# Patient Record
Sex: MALE | Race: WHITE | Employment: OTHER | URBAN - METROPOLITAN AREA
[De-identification: names, ages, dates, MRNs, and addresses within clinical notes are randomized per-mention and may not be internally consistent; named-entity substitution may affect disease eponyms.]

---

## 2019-08-20 LAB
CREATININE, EXTERNAL: 1.1
LDL-C, EXTERNAL: 94

## 2020-02-24 ENCOUNTER — OFFICE VISIT (OUTPATIENT)
Dept: FAMILY MEDICINE CLINIC | Age: 80
End: 2020-02-24

## 2020-02-24 VITALS
TEMPERATURE: 98.7 F | HEIGHT: 64 IN | WEIGHT: 146 LBS | SYSTOLIC BLOOD PRESSURE: 138 MMHG | BODY MASS INDEX: 24.92 KG/M2 | HEART RATE: 69 BPM | DIASTOLIC BLOOD PRESSURE: 74 MMHG | OXYGEN SATURATION: 95 % | RESPIRATION RATE: 15 BRPM

## 2020-02-24 DIAGNOSIS — E78.00 HYPERCHOLESTEROLEMIA: ICD-10-CM

## 2020-02-24 DIAGNOSIS — I10 ESSENTIAL HYPERTENSION: ICD-10-CM

## 2020-02-24 DIAGNOSIS — R73.9 HYPERGLYCEMIA: Primary | ICD-10-CM

## 2020-02-24 PROBLEM — R20.2 PARESTHESIA OF UPPER LIMB: Status: ACTIVE | Noted: 2020-02-24

## 2020-02-24 LAB
GLUCOSE POC: 110 MG/DL
HBA1C MFR BLD HPLC: 5.6 %

## 2020-02-24 RX ORDER — OXYBUTYNIN CHLORIDE 5 MG/1
5 TABLET ORAL AS NEEDED
COMMUNITY
End: 2020-05-26 | Stop reason: ALTCHOICE

## 2020-02-24 RX ORDER — DOCUSATE SODIUM 100 MG/1
250 CAPSULE, LIQUID FILLED ORAL 2 TIMES DAILY
COMMUNITY

## 2020-02-24 RX ORDER — DOXYCYCLINE HYCLATE 20 MG
20 TABLET ORAL 2 TIMES DAILY
COMMUNITY
End: 2021-01-01 | Stop reason: SDUPTHER

## 2020-02-24 RX ORDER — DOXYCYCLINE 50 MG/1
20 CAPSULE ORAL 2 TIMES DAILY
COMMUNITY
End: 2020-05-26 | Stop reason: SDUPTHER

## 2020-02-24 NOTE — PROGRESS NOTES
Chief Complaint   Patient presents with   [address]     Pt is a retired nephrologist. Pt sees Dr. Hortencia Taylor for neurology. Pt reports that he has noted memory changes, primarily short term changes. Pt reports that he will forget a book he is reading, why is went in a room. Pt reports some depth perception changes, has to look when putting a top on a bottle. Patient provided extensive medical history today, please see scanned documents. Patient is on chronic doxycycline to help with dental issues. No refills needed at this time. Subjective: (As above and below)     Chief Complaint   Patient presents with   [address]     he is a 67y.o. year old male who presents for evaluation. Reviewed PmHx, RxHx, FmHx, SocHx, AllgHx and updated in chart. Review of Systems - negative except as listed above    Objective:     Vitals:    02/24/20 0749   BP: 138/74   Pulse: 69   Resp: 15   Temp: 98.7 °F (37.1 °C)   TempSrc: Oral   SpO2: 95%   Weight: 146 lb (66.2 kg)   Height: 5' 4\" (1.626 m)     Physical Examination: General appearance - alert, well appearing, and in no distress  Mental status - normal mood, behavior, speech, dress, motor activity, and thought processes  Mouth - mucous membranes moist, pharynx normal without lesions  Chest - clear to auscultation, no wheezes, rales or rhonchi, symmetric air entry  Heart - normal rate, regular rhythm, normal S1, S2, no murmurs, rubs, clicks or gallops  Musculoskeletal - no joint tenderness, deformity or swelling  Extremities - peripheral pulses normal, no pedal edema, no clubbing or cyanosis    Assessment/ Plan:   1. Hyperglycemia  -Labs stable, continue to work on dietary changes  - AMB POC HEMOGLOBIN A1C  - AMB POC GLUCOSE BLOOD, BY GLUCOSE MONITORING DEVICE    2. Hypercholesterolemia  -Continue on current medications    3.  Essential hypertension  Well-controlled today    Follow-up in 3 months or as needed       I have discussed the diagnosis with the patient and the intended plan as seen in the above orders. The patient has received an after-visit summary and questions were answered concerning future plans.      Medication Side Effects and Warnings were discussed with patient: yes  Patient Labs were reviewed: yes  Patient Past Records were reviewed:  yes    Vickie Beavers M.D.

## 2020-02-24 NOTE — PROGRESS NOTES
Chief Complaint   Patient presents with   [facility] Establish Care       1. Have you been to the ER, urgent care clinic since your last visit? Hospitalized since your last visit? No    2. Have you seen or consulted any other health care providers outside of the [address] [place] [address] since your last visit? Include any pap smears or colon screening.  No    Health Maintenance Due   Topic Date Due    Lipid Screen  06/03/1950    DTaP/Tdap/Td series (1 - Tdap) 06/03/1952    Shingrix Vaccine Age 50> (1 of 2) 06/03/1991    GLAUCOMA SCREENING Q2Y  06/03/2006    Pneumococcal 65+ years (1 of 1 - PPSV23) 06/03/2006    Medicare Yearly Exam  02/24/2020

## 2020-05-26 ENCOUNTER — VIRTUAL VISIT (OUTPATIENT)
Dept: FAMILY MEDICINE CLINIC | Age: 80
End: 2020-05-26

## 2020-05-26 VITALS
BODY MASS INDEX: 25.27 KG/M2 | SYSTOLIC BLOOD PRESSURE: 132 MMHG | DIASTOLIC BLOOD PRESSURE: 74 MMHG | HEIGHT: 64 IN | HEART RATE: 68 BPM | WEIGHT: 148 LBS

## 2020-05-26 DIAGNOSIS — I10 ESSENTIAL HYPERTENSION: Primary | ICD-10-CM

## 2020-05-26 DIAGNOSIS — E78.00 HYPERCHOLESTEROLEMIA: ICD-10-CM

## 2020-05-26 DIAGNOSIS — K00.9 DENTAL ANOMALY: ICD-10-CM

## 2020-05-26 RX ORDER — ATENOLOL 50 MG/1
50 TABLET ORAL DAILY
Qty: 90 TAB | Refills: 3 | Status: SHIPPED | OUTPATIENT
Start: 2020-05-26 | End: 2021-01-01 | Stop reason: SDUPTHER

## 2020-05-26 RX ORDER — MELOXICAM 7.5 MG/1
7.5 TABLET ORAL DAILY
Qty: 90 TAB | Refills: 3 | Status: SHIPPED | OUTPATIENT
Start: 2020-05-26 | End: 2021-05-17 | Stop reason: SDUPTHER

## 2020-05-26 RX ORDER — TAMSULOSIN HYDROCHLORIDE 0.4 MG/1
0.4 CAPSULE ORAL
Qty: 90 CAP | Refills: 3 | Status: SHIPPED | OUTPATIENT
Start: 2020-05-26 | End: 2021-01-01 | Stop reason: SDUPTHER

## 2020-05-26 RX ORDER — ATORVASTATIN CALCIUM 80 MG/1
80 TABLET, FILM COATED ORAL
Qty: 90 TAB | Refills: 3 | Status: SHIPPED | OUTPATIENT
Start: 2020-05-26 | End: 2021-01-01 | Stop reason: SDUPTHER

## 2020-05-26 RX ORDER — DOXYCYCLINE 100 MG/1
100 CAPSULE ORAL 2 TIMES DAILY
Qty: 60 CAP | Refills: 3 | Status: SHIPPED | OUTPATIENT
Start: 2020-05-26 | End: 2022-01-01 | Stop reason: ALTCHOICE

## 2020-05-26 RX ORDER — LISINOPRIL 10 MG/1
10 TABLET ORAL 2 TIMES DAILY
Qty: 180 TAB | Refills: 3 | Status: SHIPPED | OUTPATIENT
Start: 2020-05-26 | End: 2021-01-01

## 2020-05-26 RX ORDER — DOXYCYCLINE 100 MG/1
100 CAPSULE ORAL 2 TIMES DAILY
COMMUNITY
End: 2020-05-26 | Stop reason: ALTCHOICE

## 2020-05-26 RX ORDER — CLINDAMYCIN HYDROCHLORIDE 150 MG/1
300 CAPSULE ORAL AS NEEDED
Qty: 30 CAP | Refills: 3 | Status: SHIPPED | OUTPATIENT
Start: 2020-05-26 | End: 2021-01-01

## 2020-05-26 NOTE — PROGRESS NOTES
Chief Complaint   Patient presents with    Medication Refill       1. Have you been to the ER, urgent care clinic since your last visit? Hospitalized since your last visit? No    2. Have you seen or consulted any other health care providers outside of the [address] [place] [address] since your last visit? Include any pap smears or colon screening.  No    Health Maintenance Due   Topic Date Due    DTaP/Tdap/Td series (1 - Tdap) 06/03/1962    Shingrix Vaccine Age 50> (1 of 2) 06/03/1991

## 2020-05-26 NOTE — PROGRESS NOTES
Chief Complaint   Patient presents with    Medication Refill     Pt was evaluated via phone only. Pt reports that he has been able to stay at home completely during the quarantine. Pt has been doing a lot of work outside, staying active. Pt needs refills on all medications with [place]. Pt reports that he has noticed a change in memory, somewhat worsened, but well articulated by patient. Fabiana Zuniga is a 67 y.o. male evaluated via audio only technology on 5/26/2020. Consent: He and/or his health care decision maker is aware that he may receive a bill for this audio only encounter, depending on his insurance coverage, and has provided verbal consent to proceed: Yes    I communicated with the patient and/or health care decision maker about the nature and details of the following:  Assessment & Plan:   1. Essential hypertension  -well controlled per home readings    2. Hypercholesterolemia  -done in February    3. Dental anomaly  -medications refilled for preventative treatment    Reviewed medications at length. 12  Subjective:   Fabiana Zuniga is a 67 y.o. male who was seen for Medication Refill      Prior to Admission medications    Medication Sig Start Date End Date Taking? Authorizing Provider   docusate sodium (COLACE) 100 mg capsule Take 100 mg by mouth two (2) times a day. Yes Provider, Historical   doxycycline (PERIOSTAT) 20 mg tablet Take 20 mg by mouth two (2) times a day. Yes Provider, Historical   meloxicam (MOBIC) 7.5 mg tablet Take 7.5 mg by mouth daily. Yes Provider, Historical   clindamycin (CLEOCIN) 150 mg capsule Take 300 mg by mouth as needed. Pre procedure and dental procedure    Yes Provider, Historical   lisinopril (PRINIVIL, ZESTRIL) 10 mg tablet Take 10 mg by mouth daily. Yes Provider, Historical   atenolol (TENORMIN) 50 mg tablet Take 50 mg by mouth daily. Yes Provider, Historical   atorvastatin (LIPITOR) 40 mg tablet Take 80 mg by mouth nightly.    Yes Provider, Historical   tamsulosin (FLOMAX) 0.4 mg capsule Take 0.4 mg by mouth nightly. Yes Provider, Historical   omeprazole (PRILOSEC) 20 mg capsule Take 20 mg by mouth daily. Yes Provider, Historical   doxycycline (MONODOX) 100 mg capsule Take 100 mg by mouth two (2) times a day. 5/26/20  Provider, Historical   doxycycline (MONODOX) 50 mg capsule Take 20 mg by mouth two (2) times a day. 5/26/20  Provider, Historical   oxybutynin (DITROPAN) 5 mg tablet Take 5 mg by mouth as needed. 5/26/20  Provider, Historical   ibuprofen (MOTRIN) 400 mg tablet Take 400 mg by mouth every six (6) hours as needed for Pain. 5/26/20  Provider, Historical   sildenafil citrate (VIAGRA) 50 mg tablet Take 50 mg by mouth as needed. 5/26/20  Provider, Historical   clopidogrel (PLAVIX) 75 mg tablet Take 1 tablet by mouth daily. 1/22/15 5/26/20  Domi Young MD   aspirin 81 mg tablet Take 81 mg by mouth daily. 5/26/20  Provider, Historical     Allergies   Allergen Reactions    Cephalexin Rash    Cephalosporins Rash    Contrast Agent [Iodine] Other (comments)     IV radio contrast caused urticaria years ago. Has had a CT scan since without problems.  Penicillins Rash       Patient Active Problem List   Diagnosis Code    Essential hypertension I10    Hypercholesterolemia E78.00    Hyperglycemia R73.9    Paresthesia of upper limb R20.2       ROS    I affirm this is a Patient-Initiated Episode with a Patient who has not had a related appointment within my department in the past 7 days or scheduled within the next 24 hours.     Total Time: minutes: 21-30 minutes    Note: not billable if this call serves to triage the patient into an appointment for the relevant concern      Gelacio Caba MD

## 2020-08-24 ENCOUNTER — HOSPITAL ENCOUNTER (OUTPATIENT)
Dept: LAB | Age: 80
Discharge: HOME | End: 2020-08-24
Payer: MEDICARE

## 2020-08-24 ENCOUNTER — OFFICE VISIT (OUTPATIENT)
Dept: FAMILY MEDICINE CLINIC | Age: 80
End: 2020-08-24
Payer: MEDICARE

## 2020-08-24 VITALS
DIASTOLIC BLOOD PRESSURE: 71 MMHG | RESPIRATION RATE: 16 BRPM | TEMPERATURE: 98.1 F | OXYGEN SATURATION: 98 % | HEART RATE: 66 BPM | BODY MASS INDEX: 24.55 KG/M2 | SYSTOLIC BLOOD PRESSURE: 160 MMHG | HEIGHT: 64 IN | WEIGHT: 143.8 LBS

## 2020-08-24 DIAGNOSIS — Z00.00 MEDICARE ANNUAL WELLNESS VISIT, SUBSEQUENT: ICD-10-CM

## 2020-08-24 DIAGNOSIS — R73.9 HYPERGLYCEMIA: ICD-10-CM

## 2020-08-24 DIAGNOSIS — E78.00 HYPERCHOLESTEROLEMIA: Primary | ICD-10-CM

## 2020-08-24 DIAGNOSIS — I10 ESSENTIAL HYPERTENSION: ICD-10-CM

## 2020-08-24 PROCEDURE — G8510 SCR DEP NEG, NO PLAN REQD: HCPCS | Performed by: FAMILY MEDICINE

## 2020-08-24 PROCEDURE — G8536 NO DOC ELDER MAL SCRN: HCPCS | Performed by: FAMILY MEDICINE

## 2020-08-24 PROCEDURE — 99213 OFFICE O/P EST LOW 20 MIN: CPT | Performed by: FAMILY MEDICINE

## 2020-08-24 PROCEDURE — 83036 HEMOGLOBIN GLYCOSYLATED A1C: CPT

## 2020-08-24 PROCEDURE — 1100F PTFALLS ASSESS-DOCD GE2>/YR: CPT | Performed by: FAMILY MEDICINE

## 2020-08-24 PROCEDURE — G8753 SYS BP > OR = 140: HCPCS | Performed by: FAMILY MEDICINE

## 2020-08-24 PROCEDURE — 99000 SPECIMEN HANDLING OFFICE-LAB: CPT | Performed by: FAMILY MEDICINE

## 2020-08-24 PROCEDURE — 3288F FALL RISK ASSESSMENT DOCD: CPT | Performed by: FAMILY MEDICINE

## 2020-08-24 PROCEDURE — G8420 CALC BMI NORM PARAMETERS: HCPCS | Performed by: FAMILY MEDICINE

## 2020-08-24 PROCEDURE — 85025 COMPLETE CBC W/AUTO DIFF WBC: CPT

## 2020-08-24 PROCEDURE — G8754 DIAS BP LESS 90: HCPCS | Performed by: FAMILY MEDICINE

## 2020-08-24 PROCEDURE — 84443 ASSAY THYROID STIM HORMONE: CPT

## 2020-08-24 PROCEDURE — G8427 DOCREV CUR MEDS BY ELIG CLIN: HCPCS | Performed by: FAMILY MEDICINE

## 2020-08-24 PROCEDURE — 80061 LIPID PANEL: CPT

## 2020-08-24 PROCEDURE — 80053 COMPREHEN METABOLIC PANEL: CPT

## 2020-08-24 PROCEDURE — 36415 COLL VENOUS BLD VENIPUNCTURE: CPT | Performed by: FAMILY MEDICINE

## 2020-08-24 PROCEDURE — G0463 HOSPITAL OUTPT CLINIC VISIT: HCPCS | Performed by: FAMILY MEDICINE

## 2020-08-24 PROCEDURE — G0439 PPPS, SUBSEQ VISIT: HCPCS | Performed by: FAMILY MEDICINE

## 2020-08-24 NOTE — PROGRESS NOTES
Chief Complaint   Patient presents with    Hypertension     follow up    Skin Problem     bottom       1. Have you been to the ER, urgent care clinic since your last visit? Hospitalized since your last visit? No    2. Have you seen or consulted any other health care providers outside of the [address] [place] [address] since your last visit? Include any pap smears or colon screening.  No    Health Maintenance Due   Topic Date Due    DTaP/Tdap/Td series (1 - Tdap) 06/03/1962    Shingrix Vaccine Age 50> (1 of 2) 06/03/1991    Medicare Yearly Exam  08/20/2020    Lipid Screen  08/21/2020

## 2020-08-24 NOTE — PROGRESS NOTES
Chief Complaint   Patient presents with    Hypertension     follow up    Skin Problem     bottom     Pt reports that he has been struggling with labile blood pressure, high and low. Pt has changed around the timing of his medications. Pt reports that he gets symptomatic when his blood pressure gets low. Pt has noticed that when he gets dehydrated his blood pressures are more up and down. Subjective: (As above and below)     Chief Complaint   Patient presents with    Hypertension     follow up    Skin Problem     bottom     he is a 79y.o. year old male who presents for evaluation. Reviewed PmHx, RxHx, FmHx, SocHx, AllgHx and updated in chart. Review of Systems - negative except as listed above    Objective:     Vitals:    08/24/20 0734   BP: 160/71   Pulse: 66   Resp: 16   Temp: 98.1 °F (36.7 °C)   TempSrc: Temporal   SpO2: 98%   Weight: 143 lb 12.8 oz (65.2 kg)   Height: 5' 4\" (1.626 m)     Physical Examination: General appearance - alert, well appearing, and in no distress  Mental status - normal mood, behavior, speech, dress, motor activity, and thought processes  Eyes - pupils equal and reactive, extraocular eye movements intact  Chest - clear to auscultation, no wheezes, rales or rhonchi, symmetric air entry  Heart - normal rate, regular rhythm, normal S1, S2, no murmurs, rubs, clicks or gallops  Musculoskeletal - no joint tenderness, deformity or swelling  Extremities - peripheral pulses normal, no pedal edema, no clubbing or cyanosis  Skin - left inner gluteal crease, flesh colored nevus with superficial trauma, slight know under the skin, no erythema    Assessment/ Plan:   1. Hypercholesterolemia  -check fasting labs  - METABOLIC PANEL, COMPREHENSIVE  - LIPID PANEL    2. Essential hypertension  -stable with current pt management  - CBC WITH AUTOMATED DIFF  - TSH 3RD GENERATION  - COLLECTION VENOUS BLOOD,VENIPUNCTURE  - [place] HANDLG&/OR CONVEY OF SPEC FOR TR OFFICE TO LAB    3. Hyperglycemia  - HEMOGLOBIN A1C WITH EAG    4. Medicare annual wellness visit, subsequent  -see below       I have discussed the diagnosis with the patient and the intended plan as seen in the above orders. The patient has received an after-visit summary and questions were answered concerning future plans. Medication Side Effects and Warnings were discussed with patient: yes  Patient Labs were reviewed: yes  Patient Past Records were reviewed:  yes    jus Womack M.D. This is the Subsequent Medicare Annual Wellness Exam, performed 12 months or more after the Initial AWV or the last Subsequent AWV    I have reviewed the patient's medical history in detail and updated the computerized patient record.      History     Patient Active Problem List   Diagnosis Code    Essential hypertension I10    Hypercholesterolemia E78.00    Hyperglycemia R73.9    Paresthesia of upper limb R20.2     Past Medical History:   Diagnosis Date    Anxiety     years ago    Arrhythmia     SVT    Arthritis     DJD knees    Chronic bronchitis (HCC)     Elevated PSA     Enlarged prostate     GERD (gastroesophageal reflux disease)     Gingivitis     Hepatitis A antibody positive     Hepatitis B antibody positive     Hypertension     Liver disease     Mitral chordae rupture (HCC)     Other ill-defined conditions(799.89)     increased cholesterol    Other ill-defined conditions(799.89)     ruptured chordea tinea right mitral valve    Other ill-defined conditions(799.89)     left ventricular hypertrophy    Other ill-defined conditions(799.89)     hepatitis A and B exposure    Other ill-defined [id number](753.47)     skin lichen planus    Tinnitus       Past Surgical History:   Procedure Laterality Date    HX HEENT      T & A    HX KNEE ARTHROSCOPY  2002    bilateral    HX OTHER SURGICAL      right inguinal hernia repair    HX OTHER SURGICAL      colonoscopy - diverticulosis/no polyps    HX OTHER SURGICAL  1969 right forearm fragment wound     Current Outpatient Medications   Medication Sig Dispense Refill    tamsulosin (Flomax) 0.4 mg capsule Take 1 Cap by mouth nightly. 90 Cap 3    atorvastatin (LIPITOR) 80 mg tablet Take 1 Tab by mouth nightly. 90 Tab 3    atenoloL (TENORMIN) 50 mg tablet Take 1 Tab by mouth daily. 90 Tab 3    meloxicam (MOBIC) 7.5 mg tablet Take 1 Tab by mouth daily. 90 Tab 3    doxycycline (MONODOX) 100 mg capsule Take 1 Cap by mouth two (2) times a day. 60 Cap 3    clindamycin (CLEOCIN) 150 mg capsule Take 2 Caps by mouth as needed (pre procedure). Pre procedure and dental procedure 30 Cap 3    docusate sodium (COLACE) 100 mg capsule Take 100 mg by mouth two (2) times a day.  doxycycline (PERIOSTAT) 20 mg tablet Take 20 mg by mouth two (2) times a day.  omeprazole (PRILOSEC) 20 mg capsule Take 20 mg by mouth daily.  lisinopriL (PRINIVIL, ZESTRIL) 10 mg tablet Take 1 Tab by mouth two (2) times a day. 180 Tab 3     Allergies   Allergen Reactions    Cephalexin Rash    Cephalosporins Rash    Contrast Agent [Iodine] Other (comments)     IV radio contrast caused urticaria years ago. Has had a CT scan since without problems.  Penicillins Rash       Family History   Problem Relation Age of Onset    Lung Disease Mother         pneumococcal sepsis/pneumonia    Diabetes Mother     Kidney Disease Father     Heart Disease Father         aortic valve disease    Anesth Problems Neg Hx      Social History     Tobacco Use    Smoking status: Current Every Day Smoker     Packs/day: 1.00     Years: 54.00     Pack years: 54.00    Smokeless tobacco: Never Used    Tobacco comment: cigarettes   Substance Use Topics    Alcohol use:  Yes     Alcohol/week: 0.8 standard drinks     Types: 1 Glasses of wine per week       Depression Risk Factor Screening:     3 most recent PHQ Screens 8/24/2020   Little interest or pleasure in doing things Not at all   Feeling down, depressed, irritable, or hopeless Not at all   Total Score PHQ 2 0       Alcohol Risk Factor Screening (MALE > 65): Do you average more 1 drink per night or more than 7 drinks a week: No    In the past three months have you have had more than 4 drinks containing alcohol on one occasion: No      Functional Ability and Level of Safety:   Hearing: The patient needs further evaluation. Pt has noted high pitched hearing loss. Activities of Daily Living: The home contains: no safety equipment. Patient does total self care     Ambulation: with no difficulty     Fall Risk:  Fall Risk Assessment, last 12 mths 8/24/2020   Able to walk? Yes   Fall in past 12 months? Yes   Fall with injury? No   Number of falls in past 12 months 8 or more   Fall Risk Score 8     Abuse Screen:  Patient is not abused       Cognitive Screening   Has your family/caregiver stated any concerns about your memory: yes - has noted gradual worsening     Patient Care Team   Patient Care Team:  Felicity Mccormack MD as PCP - General (Family Medicine)  [facility] - [facility] Empaneled Provider    Assessment/Plan   Education and counseling provided:  Are appropriate based on today's review and evaluation    Diagnoses and all orders for this visit:    1. Hypercholesterolemia  -     METABOLIC PANEL, COMPREHENSIVE  -     LIPID PANEL    2. Essential hypertension  -     CBC WITH AUTOMATED DIFF  -     TSH 3RD GENERATION  -     COLLECTION VENOUS BLOOD,VENIPUNCTURE  -     [place] HANDLG&/OR CONVEY OF SPEC FOR TR OFFICE TO LAB    3. Hyperglycemia  -     HEMOGLOBIN A1C WITH EAG    4.  Medicare annual wellness visit, subsequent        Health Maintenance Due   Topic Date Due    DTaP/Tdap/Td series (1 - Tdap) 06/03/1962    Shingrix Vaccine Age 50> (1 of 2) 06/03/1991    Lipid Screen  08/21/2020

## 2020-08-24 NOTE — PATIENT INSTRUCTIONS
Medicare Wellness Visit, Male The best way to live healthy is to have a lifestyle where you eat a well-balanced diet, exercise regularly, limit alcohol use, and quit all forms of tobacco/nicotine, if applicable. Regular preventive services are another way to keep healthy. Preventive services (vaccines, screening tests, monitoring & exams) can help personalize your care plan, which helps you manage your own care. Screening tests can find health problems at the earliest stages, when they are easiest to treat. [facility] follows the current, evidence-based guidelines published by the [facility] ([facility]) when recommending preventive services for our patients. Because we follow these guidelines, sometimes recommendations change over time as research supports it. (For example, a prostate screening blood test is no longer routinely recommended for men with no symptoms). Of course, you and your doctor may decide to screen more often for some diseases, based on your risk and co-morbidities (chronic disease you are already diagnosed with). Preventive services for you include: - Medicare offers their members a free annual wellness visit, which is time for you and your primary care provider to discuss and plan for your preventive service needs. Take advantage of this benefit every year! 
-All adults over age 72 should receive the recommended pneumonia vaccines. Current USPSTF guidelines recommend a series of two vaccines for the best pneumonia protection.  
-All adults should have a flu vaccine yearly and tetanus vaccine every 10 years. 
-All adults age 48 and older should receive the shingles vaccines (series of two vaccines).       
-All adults age 38-68 who are overweight should have a diabetes screening test once every three years.  
-Other screening tests & preventive services for persons with diabetes include: an eye exam to screen for diabetic retinopathy, a kidney function test, a foot exam, and stricter control over your cholesterol.  
-Cardiovascular screening for adults with routine risk involves an electrocardiogram (ECG) at intervals determined by the provider.  
-Colorectal cancer screening should be done for adults age 54-65 with no increased risk factors for colorectal cancer. There are a number of acceptable methods of screening for this type of cancer. Each test has its own benefits and drawbacks. Discuss with your provider what is most appropriate for you during your annual wellness visit. The different tests include: colonoscopy (considered the best screening method), a fecal occult blood test, a fecal DNA test, and sigmoidoscopy. 
-All adults born between [facility] should be screened once for Hepatitis C. 
-An Abdominal Aortic Aneurysm (AAA) Screening is recommended for men age 73-68 who has ever smoked in their lifetime. Here is a list of your current Health Maintenance items (your personalized list of preventive services) with a due date: 
Health Maintenance Due Topic Date Due  
 DTaP/Tdap/Td  (1 - Tdap) 06/03/1962  Shingles Vaccine (1 of 2) 06/03/1991  Cholesterol Test   08/21/2020

## 2020-08-25 LAB
ALBUMIN SERPL-MCNC: 4 G/DL (ref 3.7–4.7)
ALBUMIN/GLOB SERPL: 1.7 {RATIO} (ref 1.2–2.2)
ALP SERPL-CCNC: 109 IU/L (ref 39–117)
ALT SERPL-CCNC: 14 IU/L (ref 0–44)
AST SERPL-CCNC: 19 IU/L (ref 0–40)
BASOPHILS # BLD AUTO: 0.1 X10E3/UL (ref 0–0.2)
BASOPHILS NFR BLD AUTO: 1 %
BILIRUB SERPL-MCNC: 0.4 MG/DL (ref 0–1.2)
BUN SERPL-MCNC: 24 MG/DL (ref 8–27)
BUN/CREAT SERPL: 19 (ref 10–24)
CALCIUM SERPL-MCNC: 9.6 MG/DL (ref 8.6–10.2)
CHLORIDE SERPL-SCNC: 101 MMOL/L (ref 96–106)
CHOLEST SERPL-MCNC: 205 MG/DL (ref 100–199)
CO2 SERPL-SCNC: 23 MMOL/L (ref 20–29)
CREAT SERPL-MCNC: 1.27 MG/DL (ref 0.76–1.27)
EOSINOPHIL # BLD AUTO: 0.3 X10E3/UL (ref 0–0.4)
EOSINOPHIL NFR BLD AUTO: 3 %
ERYTHROCYTE [DISTWIDTH] IN BLOOD BY AUTOMATED COUNT: 13.5 % (ref 11.6–15.4)
EST. AVERAGE GLUCOSE BLD GHB EST-MCNC: 120 MG/DL
GLOBULIN SER CALC-MCNC: 2.4 G/DL (ref 1.5–4.5)
GLUCOSE SERPL-MCNC: 94 MG/DL (ref 65–99)
HBA1C MFR BLD: 5.8 % (ref 4.8–5.6)
HCT VFR BLD AUTO: 41.8 % (ref 37.5–51)
HDLC SERPL-MCNC: 66 MG/DL
HGB BLD-MCNC: 14 G/DL (ref 13–17.7)
IMM GRANULOCYTES # BLD AUTO: 0 X10E3/UL (ref 0–0.1)
IMM GRANULOCYTES NFR BLD AUTO: 0 %
INTERPRETATION, 910389: NORMAL
INTERPRETATION: NORMAL
LDLC SERPL CALC-MCNC: 128 MG/DL (ref 0–99)
LYMPHOCYTES # BLD AUTO: 1.4 X10E3/UL (ref 0.7–3.1)
LYMPHOCYTES NFR BLD AUTO: 14 %
MCH RBC QN AUTO: 29.1 PG (ref 26.6–33)
MCHC RBC AUTO-ENTMCNC: 33.5 G/DL (ref 31.5–35.7)
MCV RBC AUTO: 87 FL (ref 79–97)
MONOCYTES # BLD AUTO: 0.9 X10E3/UL (ref 0.1–0.9)
MONOCYTES NFR BLD AUTO: 9 %
NEUTROPHILS # BLD AUTO: 7.4 X10E3/UL (ref 1.4–7)
NEUTROPHILS NFR BLD AUTO: 73 %
PDF IMAGE, 910387: NORMAL
PLATELET # BLD AUTO: 294 X10E3/UL (ref 150–450)
POTASSIUM SERPL-SCNC: 4.9 MMOL/L (ref 3.5–5.2)
PROT SERPL-MCNC: 6.4 G/DL (ref 6–8.5)
RBC # BLD AUTO: 4.81 X10E6/UL (ref 4.14–5.8)
SODIUM SERPL-SCNC: 138 MMOL/L (ref 134–144)
TRIGL SERPL-MCNC: 54 MG/DL (ref 0–149)
TSH SERPL DL<=0.005 MIU/L-ACNC: 0.92 UIU/ML (ref 0.45–4.5)
VLDLC SERPL CALC-MCNC: 11 MG/DL (ref 5–40)
WBC # BLD AUTO: 10.1 X10E3/UL (ref 3.4–10.8)

## 2020-08-27 NOTE — PROGRESS NOTES
Increase in risk for diabetes, please closely monitor diet and increase exercise   Cholesterol is elevated, please closely monitor diet and increase exercise, recheck in 6 months. All other labs are within normal limits. A message has been sent in [employer] and the lab work released to the patient.

## 2021-01-01 ENCOUNTER — TELEPHONE (OUTPATIENT)
Dept: SURGERY | Age: 81
End: 2021-01-01

## 2021-01-01 ENCOUNTER — OFFICE VISIT (OUTPATIENT)
Dept: SURGERY | Age: 81
End: 2021-01-01
Payer: MEDICARE

## 2021-01-01 ENCOUNTER — OFFICE VISIT (OUTPATIENT)
Dept: ONCOLOGY | Age: 81
End: 2021-01-01
Payer: MEDICARE

## 2021-01-01 ENCOUNTER — TELEPHONE (OUTPATIENT)
Dept: ONCOLOGY | Age: 81
End: 2021-01-01

## 2021-01-01 ENCOUNTER — DOCUMENTATION ONLY (OUTPATIENT)
Dept: ONCOLOGY | Age: 81
End: 2021-01-01

## 2021-01-01 ENCOUNTER — TELEPHONE (OUTPATIENT)
Dept: FAMILY MEDICINE CLINIC | Age: 81
End: 2021-01-01

## 2021-01-01 ENCOUNTER — HOSPITAL ENCOUNTER (OUTPATIENT)
Dept: CT IMAGING | Age: 81
Discharge: HOME | End: 2021-08-25
Attending: SURGERY
Payer: MEDICARE

## 2021-01-01 ENCOUNTER — HOSPITAL ENCOUNTER (OUTPATIENT)
Dept: LAB | Age: 81
Discharge: HOME | End: 2021-08-31

## 2021-01-01 ENCOUNTER — OFFICE VISIT (OUTPATIENT)
Dept: FAMILY MEDICINE CLINIC | Age: 81
End: 2021-01-01
Payer: MEDICARE

## 2021-01-01 VITALS
DIASTOLIC BLOOD PRESSURE: 63 MMHG | TEMPERATURE: 98.1 F | SYSTOLIC BLOOD PRESSURE: 143 MMHG | BODY MASS INDEX: 25.1 KG/M2 | HEART RATE: 70 BPM | HEIGHT: 64 IN | OXYGEN SATURATION: 97 % | WEIGHT: 147 LBS

## 2021-01-01 VITALS
RESPIRATION RATE: 16 BRPM | SYSTOLIC BLOOD PRESSURE: 136 MMHG | HEIGHT: 64 IN | BODY MASS INDEX: 24.96 KG/M2 | OXYGEN SATURATION: 97 % | DIASTOLIC BLOOD PRESSURE: 69 MMHG | WEIGHT: 146.2 LBS | TEMPERATURE: 98.2 F | HEART RATE: 58 BPM

## 2021-01-01 VITALS
TEMPERATURE: 97.5 F | DIASTOLIC BLOOD PRESSURE: 57 MMHG | SYSTOLIC BLOOD PRESSURE: 137 MMHG | HEART RATE: 68 BPM | RESPIRATION RATE: 18 BRPM | HEIGHT: 64 IN | BODY MASS INDEX: 24.24 KG/M2 | WEIGHT: 142 LBS | OXYGEN SATURATION: 97 %

## 2021-01-01 VITALS
TEMPERATURE: 97.3 F | SYSTOLIC BLOOD PRESSURE: 162 MMHG | HEART RATE: 78 BPM | HEIGHT: 64 IN | WEIGHT: 142 LBS | BODY MASS INDEX: 24.24 KG/M2 | OXYGEN SATURATION: 98 % | DIASTOLIC BLOOD PRESSURE: 78 MMHG | RESPIRATION RATE: 16 BRPM

## 2021-01-01 VITALS
OXYGEN SATURATION: 99 % | RESPIRATION RATE: 18 BRPM | SYSTOLIC BLOOD PRESSURE: 167 MMHG | WEIGHT: 145.9 LBS | BODY MASS INDEX: 24.91 KG/M2 | DIASTOLIC BLOOD PRESSURE: 70 MMHG | TEMPERATURE: 98 F | HEART RATE: 64 BPM | HEIGHT: 64 IN

## 2021-01-01 VITALS
DIASTOLIC BLOOD PRESSURE: 60 MMHG | HEIGHT: 64 IN | RESPIRATION RATE: 18 BRPM | HEART RATE: 65 BPM | OXYGEN SATURATION: 100 % | WEIGHT: 146 LBS | TEMPERATURE: 98.4 F | BODY MASS INDEX: 24.92 KG/M2 | SYSTOLIC BLOOD PRESSURE: 150 MMHG

## 2021-01-01 DIAGNOSIS — Z00.00 MEDICARE ANNUAL WELLNESS VISIT, SUBSEQUENT: ICD-10-CM

## 2021-01-01 DIAGNOSIS — I10 ESSENTIAL HYPERTENSION: ICD-10-CM

## 2021-01-01 DIAGNOSIS — R22.2 MASS ON BACK: ICD-10-CM

## 2021-01-01 DIAGNOSIS — Z79.899 ENCOUNTER FOR MONITORING CARDIOTOXIC DRUG THERAPY: ICD-10-CM

## 2021-01-01 DIAGNOSIS — R22.2 MASS ON BACK: Primary | ICD-10-CM

## 2021-01-01 DIAGNOSIS — R73.9 HYPERGLYCEMIA: ICD-10-CM

## 2021-01-01 DIAGNOSIS — Z23 NEEDS FLU SHOT: Primary | ICD-10-CM

## 2021-01-01 DIAGNOSIS — K00.9 DENTAL ANOMALY: ICD-10-CM

## 2021-01-01 DIAGNOSIS — C49.9 LEIOMYOSARCOMA (HCC): Primary | ICD-10-CM

## 2021-01-01 DIAGNOSIS — E78.00 HYPERCHOLESTEROLEMIA: ICD-10-CM

## 2021-01-01 DIAGNOSIS — M25.50 ARTHRALGIA, UNSPECIFIED JOINT: ICD-10-CM

## 2021-01-01 DIAGNOSIS — R22.2 MASS OF SUBCUTANEOUS TISSUE OF BACK: Primary | ICD-10-CM

## 2021-01-01 DIAGNOSIS — Z51.81 ENCOUNTER FOR MONITORING CARDIOTOXIC DRUG THERAPY: ICD-10-CM

## 2021-01-01 LAB
ALBUMIN SERPL-MCNC: 3.6 G/DL (ref 3.5–5)
ALBUMIN/GLOB SERPL: 1.1 {RATIO} (ref 1.1–2.2)
ALP SERPL-CCNC: 125 U/L (ref 45–117)
ALT SERPL-CCNC: 22 U/L (ref 12–78)
ANION GAP SERPL CALC-SCNC: 2 MMOL/L (ref 5–15)
AST SERPL-CCNC: 21 U/L (ref 15–37)
BILIRUB SERPL-MCNC: 0.6 MG/DL (ref 0.2–1)
BUN SERPL-MCNC: 27 MG/DL (ref 6–20)
BUN/CREAT SERPL: 22 (ref 12–20)
CALCIUM SERPL-MCNC: 9.6 MG/DL (ref 8.5–10.1)
CHLORIDE SERPL-SCNC: 103 MMOL/L (ref 97–108)
CO2 SERPL-SCNC: 29 MMOL/L (ref 21–32)
CREAT SERPL-MCNC: 1.24 MG/DL (ref 0.7–1.3)
ERYTHROCYTE [DISTWIDTH] IN BLOOD BY AUTOMATED COUNT: 14.5 % (ref 11.5–14.5)
EST. AVERAGE GLUCOSE BLD GHB EST-MCNC: 111 MG/DL
GLOBULIN SER CALC-MCNC: 3.3 G/DL (ref 2–4)
GLUCOSE SERPL-MCNC: 90 MG/DL (ref 65–100)
HBA1C MFR BLD: 5.5 % (ref 4–5.6)
HCT VFR BLD AUTO: 42.4 % (ref 36.6–50.3)
HGB BLD-MCNC: 13.6 G/DL (ref 12.1–17)
MCH RBC QN AUTO: 29.2 PG (ref 26–34)
MCHC RBC AUTO-ENTMCNC: 32.1 G/DL (ref 30–36.5)
MCV RBC AUTO: 91 FL (ref 80–99)
NRBC # BLD: 0 K/UL (ref 0–0.01)
NRBC BLD-RTO: 0 PER 100 WBC
PLATELET # BLD AUTO: 311 K/UL (ref 150–400)
PMV BLD AUTO: 11.7 FL (ref 8.9–12.9)
POTASSIUM SERPL-SCNC: 4.3 MMOL/L (ref 3.5–5.1)
PROT SERPL-MCNC: 6.9 G/DL (ref 6.4–8.2)
RBC # BLD AUTO: 4.66 M/UL (ref 4.1–5.7)
SODIUM SERPL-SCNC: 134 MMOL/L (ref 136–145)
WBC # BLD AUTO: 10.6 K/UL (ref 4.1–11.1)

## 2021-01-01 PROCEDURE — G8753 SYS BP > OR = 140: HCPCS | Performed by: INTERNAL MEDICINE

## 2021-01-01 PROCEDURE — G8420 CALC BMI NORM PARAMETERS: HCPCS | Performed by: FAMILY MEDICINE

## 2021-01-01 PROCEDURE — 1101F PT FALLS ASSESS-DOCD LE1/YR: CPT | Performed by: FAMILY MEDICINE

## 2021-01-01 PROCEDURE — G8536 NO DOC ELDER MAL SCRN: HCPCS | Performed by: FAMILY MEDICINE

## 2021-01-01 PROCEDURE — 99213 OFFICE O/P EST LOW 20 MIN: CPT | Performed by: FAMILY MEDICINE

## 2021-01-01 PROCEDURE — 1100F PTFALLS ASSESS-DOCD GE2>/YR: CPT | Performed by: SURGERY

## 2021-01-01 PROCEDURE — G8754 DIAS BP LESS 90: HCPCS | Performed by: SURGERY

## 2021-01-01 PROCEDURE — G8419 CALC BMI OUT NRM PARAM NOF/U: HCPCS | Performed by: SURGERY

## 2021-01-01 PROCEDURE — G8752 SYS BP LESS 140: HCPCS | Performed by: FAMILY MEDICINE

## 2021-01-01 PROCEDURE — G8536 NO DOC ELDER MAL SCRN: HCPCS | Performed by: SURGERY

## 2021-01-01 PROCEDURE — 99212 OFFICE O/P EST SF 10 MIN: CPT | Performed by: SURGERY

## 2021-01-01 PROCEDURE — G8427 DOCREV CUR MEDS BY ELIG CLIN: HCPCS | Performed by: SURGERY

## 2021-01-01 PROCEDURE — 71250 CT THORAX DX C-: CPT

## 2021-01-01 PROCEDURE — G0439 PPPS, SUBSEQ VISIT: HCPCS | Performed by: FAMILY MEDICINE

## 2021-01-01 PROCEDURE — G8754 DIAS BP LESS 90: HCPCS | Performed by: FAMILY MEDICINE

## 2021-01-01 PROCEDURE — G8753 SYS BP > OR = 140: HCPCS | Performed by: SURGERY

## 2021-01-01 PROCEDURE — G8420 CALC BMI NORM PARAMETERS: HCPCS | Performed by: SURGERY

## 2021-01-01 PROCEDURE — G8510 SCR DEP NEG, NO PLAN REQD: HCPCS | Performed by: FAMILY MEDICINE

## 2021-01-01 PROCEDURE — G8432 DEP SCR NOT DOC, RNG: HCPCS | Performed by: SURGERY

## 2021-01-01 PROCEDURE — G8419 CALC BMI OUT NRM PARAM NOF/U: HCPCS | Performed by: INTERNAL MEDICINE

## 2021-01-01 PROCEDURE — 3288F FALL RISK ASSESSMENT DOCD: CPT | Performed by: SURGERY

## 2021-01-01 PROCEDURE — 90662 IIV NO PRSV INCREASED AG IM: CPT | Performed by: FAMILY MEDICINE

## 2021-01-01 PROCEDURE — 11104 PUNCH BX SKIN SINGLE LESION: CPT | Performed by: SURGERY

## 2021-01-01 PROCEDURE — G8432 DEP SCR NOT DOC, RNG: HCPCS | Performed by: INTERNAL MEDICINE

## 2021-01-01 PROCEDURE — 1101F PT FALLS ASSESS-DOCD LE1/YR: CPT | Performed by: SURGERY

## 2021-01-01 PROCEDURE — G8427 DOCREV CUR MEDS BY ELIG CLIN: HCPCS | Performed by: FAMILY MEDICINE

## 2021-01-01 PROCEDURE — G8427 DOCREV CUR MEDS BY ELIG CLIN: HCPCS | Performed by: INTERNAL MEDICINE

## 2021-01-01 PROCEDURE — 1101F PT FALLS ASSESS-DOCD LE1/YR: CPT | Performed by: INTERNAL MEDICINE

## 2021-01-01 PROCEDURE — G8754 DIAS BP LESS 90: HCPCS | Performed by: INTERNAL MEDICINE

## 2021-01-01 PROCEDURE — 3288F FALL RISK ASSESSMENT DOCD: CPT | Performed by: FAMILY MEDICINE

## 2021-01-01 PROCEDURE — G8536 NO DOC ELDER MAL SCRN: HCPCS | Performed by: INTERNAL MEDICINE

## 2021-01-01 PROCEDURE — 76942 ECHO GUIDE FOR BIOPSY: CPT | Performed by: SURGERY

## 2021-01-01 PROCEDURE — 1100F PTFALLS ASSESS-DOCD GE2>/YR: CPT | Performed by: FAMILY MEDICINE

## 2021-01-01 PROCEDURE — 99205 OFFICE O/P NEW HI 60 MIN: CPT | Performed by: INTERNAL MEDICINE

## 2021-01-01 PROCEDURE — G0463 HOSPITAL OUTPT CLINIC VISIT: HCPCS | Performed by: FAMILY MEDICINE

## 2021-01-01 PROCEDURE — G8419 CALC BMI OUT NRM PARAM NOF/U: HCPCS | Performed by: FAMILY MEDICINE

## 2021-01-01 PROCEDURE — 99203 OFFICE O/P NEW LOW 30 MIN: CPT | Performed by: SURGERY

## 2021-01-01 PROCEDURE — G8510 SCR DEP NEG, NO PLAN REQD: HCPCS | Performed by: SURGERY

## 2021-01-01 PROCEDURE — G0463 HOSPITAL OUTPT CLINIC VISIT: HCPCS | Performed by: INTERNAL MEDICINE

## 2021-01-01 RX ORDER — CELECOXIB 200 MG/1
200 CAPSULE ORAL 2 TIMES DAILY
Qty: 180 CAPSULE | Refills: 3 | Status: SHIPPED | OUTPATIENT
Start: 2021-01-01 | End: 2021-01-01 | Stop reason: SDUPTHER

## 2021-01-01 RX ORDER — CELECOXIB 200 MG/1
200 CAPSULE ORAL 2 TIMES DAILY
Qty: 180 CAPSULE | Refills: 3 | Status: SHIPPED | OUTPATIENT
Start: 2021-01-01 | End: 2022-01-01

## 2021-01-01 RX ORDER — LISINOPRIL 10 MG/1
5 TABLET ORAL DAILY
COMMUNITY

## 2021-01-01 RX ORDER — DOXYCYCLINE HYCLATE 20 MG
20 TABLET ORAL 2 TIMES DAILY
Qty: 180 TABLET | Refills: 3 | Status: SHIPPED | OUTPATIENT
Start: 2021-01-01 | End: 2022-01-01 | Stop reason: ALTCHOICE

## 2021-01-01 RX ORDER — ATORVASTATIN CALCIUM 80 MG/1
80 TABLET, FILM COATED ORAL
Qty: 90 TABLET | Refills: 3 | Status: SHIPPED | OUTPATIENT
Start: 2021-01-01 | End: 2022-01-01 | Stop reason: ALTCHOICE

## 2021-01-01 RX ORDER — ATENOLOL 50 MG/1
50 TABLET ORAL DAILY
Qty: 90 TABLET | Refills: 3 | Status: SHIPPED | OUTPATIENT
Start: 2021-01-01

## 2021-01-01 RX ORDER — MELOXICAM 15 MG/1
15 TABLET ORAL DAILY
Qty: 90 TABLET | Refills: 3 | Status: SHIPPED | OUTPATIENT
Start: 2021-01-01 | End: 2021-01-01

## 2021-01-01 RX ORDER — PREDNISONE 50 MG/1
50 TABLET ORAL AS NEEDED
Qty: 3 TABLET | Refills: 0 | Status: CANCELLED | OUTPATIENT
Start: 2021-01-01

## 2021-01-01 RX ORDER — TAMSULOSIN HYDROCHLORIDE 0.4 MG/1
0.4 CAPSULE ORAL
Qty: 90 CAPSULE | Refills: 3 | Status: SHIPPED | OUTPATIENT
Start: 2021-01-01 | End: 2022-01-01

## 2021-05-17 ENCOUNTER — OFFICE VISIT (OUTPATIENT)
Dept: FAMILY MEDICINE CLINIC | Age: 81
End: 2021-05-17
Payer: MEDICARE

## 2021-05-17 VITALS
HEART RATE: 60 BPM | TEMPERATURE: 97.8 F | HEIGHT: 64 IN | WEIGHT: 150 LBS | BODY MASS INDEX: 25.61 KG/M2 | SYSTOLIC BLOOD PRESSURE: 145 MMHG | OXYGEN SATURATION: 98 % | RESPIRATION RATE: 16 BRPM | DIASTOLIC BLOOD PRESSURE: 61 MMHG

## 2021-05-17 DIAGNOSIS — R73.9 HYPERGLYCEMIA: ICD-10-CM

## 2021-05-17 DIAGNOSIS — M25.50 ARTHRALGIA, UNSPECIFIED JOINT: ICD-10-CM

## 2021-05-17 DIAGNOSIS — E78.00 HYPERCHOLESTEROLEMIA: ICD-10-CM

## 2021-05-17 DIAGNOSIS — I10 ESSENTIAL HYPERTENSION: Primary | ICD-10-CM

## 2021-05-17 LAB
ALBUMIN SERPL-MCNC: 3.7 G/DL (ref 3.5–5)
ALBUMIN/GLOB SERPL: 1.3 {RATIO} (ref 1.1–2.2)
ALP SERPL-CCNC: 103 U/L (ref 45–117)
ALT SERPL-CCNC: 24 U/L (ref 12–78)
ANION GAP SERPL CALC-SCNC: 5 MMOL/L (ref 5–15)
AST SERPL-CCNC: 19 U/L (ref 15–37)
BILIRUB SERPL-MCNC: 0.5 MG/DL (ref 0.2–1)
BUN SERPL-MCNC: 21 MG/DL (ref 6–20)
BUN/CREAT SERPL: 18 (ref 12–20)
CALCIUM SERPL-MCNC: 8.9 MG/DL (ref 8.5–10.1)
CHLORIDE SERPL-SCNC: 105 MMOL/L (ref 97–108)
CHOLEST SERPL-MCNC: 173 MG/DL
CO2 SERPL-SCNC: 28 MMOL/L (ref 21–32)
CREAT SERPL-MCNC: 1.19 MG/DL (ref 0.7–1.3)
ERYTHROCYTE [DISTWIDTH] IN BLOOD BY AUTOMATED COUNT: 15.5 % (ref 11.5–14.5)
EST. AVERAGE GLUCOSE BLD GHB EST-MCNC: 117 MG/DL
GLOBULIN SER CALC-MCNC: 2.9 G/DL (ref 2–4)
GLUCOSE SERPL-MCNC: 97 MG/DL (ref 65–100)
HBA1C MFR BLD: 5.7 % (ref 4–5.6)
HCT VFR BLD AUTO: 43.6 % (ref 36.6–50.3)
HDLC SERPL-MCNC: 67 MG/DL
HDLC SERPL: 2.6 {RATIO} (ref 0–5)
HGB BLD-MCNC: 13.8 G/DL (ref 12.1–17)
LDLC SERPL CALC-MCNC: 92.4 MG/DL (ref 0–100)
MCH RBC QN AUTO: 29.6 PG (ref 26–34)
MCHC RBC AUTO-ENTMCNC: 31.7 G/DL (ref 30–36.5)
MCV RBC AUTO: 93.4 FL (ref 80–99)
NRBC # BLD: 0 K/UL (ref 0–0.01)
NRBC BLD-RTO: 0 PER 100 WBC
PLATELET # BLD AUTO: 253 K/UL (ref 150–400)
PMV BLD AUTO: 11.2 FL (ref 8.9–12.9)
POTASSIUM SERPL-SCNC: 4.8 MMOL/L (ref 3.5–5.1)
PROT SERPL-MCNC: 6.6 G/DL (ref 6.4–8.2)
RBC # BLD AUTO: 4.67 M/UL (ref 4.1–5.7)
SODIUM SERPL-SCNC: 138 MMOL/L (ref 136–145)
TRIGL SERPL-MCNC: 68 MG/DL (ref ?–150)
TSH SERPL DL<=0.05 MIU/L-ACNC: 0.5 UIU/ML (ref 0.36–3.74)
VLDLC SERPL CALC-MCNC: 13.6 MG/DL
WBC # BLD AUTO: 9.2 K/UL (ref 4.1–11.1)

## 2021-05-17 PROCEDURE — G8510 SCR DEP NEG, NO PLAN REQD: HCPCS | Performed by: FAMILY MEDICINE

## 2021-05-17 PROCEDURE — 99214 OFFICE O/P EST MOD 30 MIN: CPT | Performed by: FAMILY MEDICINE

## 2021-05-17 PROCEDURE — G8427 DOCREV CUR MEDS BY ELIG CLIN: HCPCS | Performed by: FAMILY MEDICINE

## 2021-05-17 PROCEDURE — 1100F PTFALLS ASSESS-DOCD GE2>/YR: CPT | Performed by: FAMILY MEDICINE

## 2021-05-17 PROCEDURE — G8753 SYS BP > OR = 140: HCPCS | Performed by: FAMILY MEDICINE

## 2021-05-17 PROCEDURE — G8536 NO DOC ELDER MAL SCRN: HCPCS | Performed by: FAMILY MEDICINE

## 2021-05-17 PROCEDURE — G0463 HOSPITAL OUTPT CLINIC VISIT: HCPCS | Performed by: FAMILY MEDICINE

## 2021-05-17 PROCEDURE — G8754 DIAS BP LESS 90: HCPCS | Performed by: FAMILY MEDICINE

## 2021-05-17 PROCEDURE — 3288F FALL RISK ASSESSMENT DOCD: CPT | Performed by: FAMILY MEDICINE

## 2021-05-17 PROCEDURE — G8419 CALC BMI OUT NRM PARAM NOF/U: HCPCS | Performed by: FAMILY MEDICINE

## 2021-05-17 RX ORDER — MELOXICAM 15 MG/1
15 TABLET ORAL DAILY
Qty: 90 TAB | Refills: 3 | Status: SHIPPED | OUTPATIENT
Start: 2021-05-17 | End: 2021-01-01 | Stop reason: SDUPTHER

## 2021-05-17 RX ORDER — OMEPRAZOLE 20 MG/1
20 CAPSULE, DELAYED RELEASE ORAL DAILY
COMMUNITY

## 2021-05-17 NOTE — PROGRESS NOTES
1. Have you been to the ER, urgent care clinic since your last visit? Hospitalized since your last visit? No    2. Have you seen or consulted any other health care providers outside of the [address] [place] [address] since your last visit? Include any pap smears or colon screening.  No    Health Maintenance Due   Topic Date Due    Hepatitis C Screening  Never done    COVID-19 Vaccine (1) Never done    DTaP/Tdap/Td series (1 - Tdap) 06/03/1962    Shingrix Vaccine Age 50> (1 of 2) Never done       Chief Complaint   Patient presents with    Follow-up

## 2021-05-17 NOTE — PROGRESS NOTES
Chief Complaint   Patient presents with    Follow-up     Pt has been diagnosed with calcified aortic stenosis, working with Dr. Domingo Gutiérrez. Pt has declined surgery. Pt has been having memory loss, stumbling. 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 Pt has trips frequently, but no injuries or falls. Pt measures Bp at home, runs 118-167, fluctuates widely. No headaches, no edema, no CP. Pt has some neck and knee pain. 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 Pt is in the process of moving. Leaving the farm. Subjective: (As above and below)     Chief Complaint   Patient presents with    Follow-up     he is a 79y.o. year old male who presents for evaluation. Reviewed PmHx, RxHx, FmHx, SocHx, AllgHx and updated in chart. Review of Systems - negative except as listed above    Objective:     Vitals:    05/17/21 0821 05/17/21 0839   BP: (!) 161/72 (!) 145/61   Pulse: 60    Resp: 16    Temp: 97.8 °F (36.6 °C)    TempSrc: Temporal    SpO2: 98%    Weight: 150 lb (68 kg)    Height: 5' 4\" (1.626 m)      Physical Examination: General appearance - alert, well appearing, and in no distress  Mental status - normal mood, behavior, speech, dress, motor activity, and thought processes  Ears - bilateral TM's and external ear canals normal  Chest - clear to auscultation, no wheezes, rales or rhonchi, symmetric air entry  Heart -normal rate, holosystolic murmur  Musculoskeletal - no joint tenderness, deformity or swelling  Extremities - peripheral pulses normal, no pedal edema, no clubbing or cyanosis    Assessment/ Plan:   1. Essential hypertension  -check labs  - CBC W/O DIFF; Future  - TSH 3RD GENERATION; Future    2. Hypercholesterolemia  - METABOLIC PANEL, COMPREHENSIVE; Future  - LIPID PANEL; Future    3. Hyperglycemia  - HEMOGLOBIN A1C WITH EAG; Future    4.  Arthralgia, unspecified joint  -increase dose  - meloxicam (MOBIC) 15 mg tablet; Take 1 Tab by mouth daily. Dispense: 90 Tab; Refill: 3       I have discussed the diagnosis with the patient and the intended plan as seen in the above orders. The patient has received an after-visit summary and questions were answered concerning future plans.      Medication Side Effects and Warnings were discussed with patient: yes  Patient Labs were reviewed: yes  Patient Past Records were reviewed:  yes    Nelson Russell M.D.

## 2021-05-18 NOTE — PROGRESS NOTES
Blood sugar levels stable. Cholesterol is significantly improved - at goal!  All other labs are within normal limits. A message has been sent in [employer] and the lab work released to the patient.

## 2021-08-12 NOTE — PROGRESS NOTES
Chief Complaint   Patient presents with    Mass     middle of back right side      Pt has had a lump on his back for years. Recently had an episode in the middle of the night where he had severe pain in that area. Since then he has had weird sensations, shooting pains, even itch, but has not gone away since then. 3 weeks ago noted that mass was much bigger, doubled from 1.5 to 3, then went back down. No external drainage. No redness. Pt also has aortic stenosis, has been reducing lisinopril, less then 5 mg per week. Pt is allowing BP to run higher to compensate. He is still taking atenolol. Subjective: (As above and below)     Chief Complaint   Patient presents with    Mass     middle of back right side      he is a [de-identified]y.o. year old male who presents for evaluation. Reviewed PmHx, RxHx, FmHx, SocHx, AllgHx and updated in chart. Review of Systems - negative except as listed above    Objective:     Vitals:    08/12/21 1021   BP: (!) 137/57   Pulse: 68   Resp: 18   Temp: 97.5 °F (36.4 °C)   SpO2: 97%   Weight: 142 lb (64.4 kg)   Height: 5' 4\" (1.626 m)     Physical Examination: General appearance - alert, well appearing, and in no distress  Mental status - normal mood, behavior, speech, dress, motor activity, and thought processes  Chest - clear to auscultation, no wheezes, rales or rhonchi, symmetric air entry  Heart - normal rate, regular rhythm, normal S1, S2, + murmur, rubs, clicks or gallops  Musculoskeletal - 4x5 mass of the right mid back, non mobile    Assessment/ Plan:   1. Mass of subcutaneous tissue of back  -refer to surgery for removal   - REFERRAL TO GENERAL SURGERY       I have discussed the diagnosis with the patient and the intended plan as seen in the above orders. The patient has received an after-visit summary and questions were answered concerning future plans.      Medication Side Effects and Warnings were discussed with patient: yes  Patient Labs were reviewed: yes  Patient Past Records were reviewed:  yes    Jessie Scruggs M.D.

## 2021-08-12 NOTE — PROGRESS NOTES
Chief Complaint   Patient presents with    Mass     middle of back right side      Health Maintenance reviewed     1. Have you been to the ER, urgent care clinic since your last visit? Hospitalized since your last visit? No     2. Have you seen or consulted any other health care providers outside of the [address] [place] [address] since your last visit? Include any pap smears or colon screening.   No

## 2021-08-17 NOTE — PROGRESS NOTES
Identified pt with two pt identifiers(name and ). Reviewed record in preparation for visit and have obtained necessary documentation. All patient medications has been reviewed. Chief Complaint   Patient presents with    Mass     Seen at the request of Dr Nelly Rasmussen for eval of mass on back       Health Maintenance Due   Topic    Low dose CT lung screening     Medicare Yearly Exam        Vitals:    21 0935   BP: (!) 162/78   Pulse: 78   Resp: 16   Temp: 97.3 °F (36.3 °C)   TempSrc: Temporal   SpO2: 98%   Weight: 64.4 kg (142 lb)   Height: 5' 4\" (1.626 m)   PainSc:   0 - No pain       4. Have you been to the ER, urgent care clinic since your last visit? Hospitalized since your last visit? No    5. Have you seen or consulted any other health care providers outside of the [address] [place] [address] since your last visit? Include any pap smears or colon screening. No      Patient is accompanied by self I have received verbal consent from Iggy Sage to discuss any/all medical information while they are present in the room.

## 2021-08-17 NOTE — Clinical Note
8/23/2021    Patient: Danya Vega   YOB: 1940   Date of Visit: 8/17/2021     Tara Casey MD  [address] [place] [address] [phone]  [place] [place] [address]    Dear Donte Mcgrath MD,      Thank you for referring Mr. Danya Vega to  [facility]  for evaluation. My notes for this consultation are attached. If you have questions, please do not hesitate to call me. I look forward to following your patient along with you.       Sincerely,    Lynnette Peres MD

## 2021-08-20 NOTE — TELEPHONE ENCOUNTER
Patient called and is scheduled for surgery on 8/31 and thought he was suppose to be scheduled for MRI or CT before having surgery.     Patient said he would like to have MRI or CT done before appointment, he thought he was having a CT due to insurance purposes but has not received a call to schedule that      Please give patient a call back   [facility]# [phone]

## 2021-08-23 NOTE — PROGRESS NOTES
To: Dorian Casey MD    From: Milnid Horta MD    Thank you for sending Cosme Ortega to see us. Please note that this dictation was completed with [employer]â„¢, the computer voice recognition software. Quite often unanticipated grammatical, syntax, homophones, and other interpretive errors are inadvertently transcribed by the software. Please disregard these errors. Please excuse any errors that have escaped final proofreading. Encounter Date: 8/17/2021  History and Physical    Assessment:   Firm nonmobile mass on the right back below the scapula. Concerning for sarcoma. Body mass index is 24.37 kg/m². Plan:   I recommended core needle biopsy here in the office. I will also get him set up for axial imaging in order to look at the extent of internal involvement. CAT scan with IV contrast would be a reasonable starting point since we are doing the biopsy anyway. Risks including bleeding and infection were explained to the patient. The patient expressed understanding of the risks, and all questions were answered to the patient's satisfaction. HPI:   Cosme Ortega is a [de-identified] y.o. male who is seen in consultation at the request of Dorian Casey MD for evaluation of a mass on his back. It was first noticed several years ago. In the past it had not bothered him much but he had a recent episode of significant pain during the night. He feels like it was swollen for a period thereafter but has since gone down. There has not been redness of the skin. There has not been drainage. There has been some itching.     Past Medical History:   Diagnosis Date    Anxiety     years ago    Arrhythmia     SVT    Arthritis     DJD knees    Chronic bronchitis (HCC)     Elevated PSA     Enlarged prostate     GERD (gastroesophageal reflux disease)     Gingivitis     Hepatitis A antibody positive     Hepatitis B antibody positive     Hypertension     Liver disease     Mitral chordae rupture (HCC)     Other ill-defined conditions(799.89)     increased cholesterol    Other ill-defined conditions(799.89)     ruptured chordea tinea right mitral valve    Other ill-defined conditions(799.89)     left ventricular hypertrophy    Other ill-defined conditions(799.89)     hepatitis A and B exposure    Other ill-defined KMYDCUMMBG(652.81)     skin lichen planus    Tinnitus      Past Surgical History:   Procedure Laterality Date    HX HEENT      T & A    HX KNEE ARTHROSCOPY  2002    bilateral    HX OTHER SURGICAL      right inguinal hernia repair    HX OTHER SURGICAL      colonoscopy - diverticulosis/no polyps    HX OTHER SURGICAL  1969    right forearm fragment wound      Family History   Problem Relation Age of Onset    Lung Disease Mother         pneumococcal sepsis/pneumonia    Diabetes Mother     Kidney Disease Father     Heart Disease Father         aortic valve disease    Anesth Problems Neg Hx      Social History     Tobacco Use    Smoking status: Current Every Day Smoker     Packs/day: 1.00     Years: 54.00     Pack years: 54.00    Smokeless tobacco: Never Used    Tobacco comment: cigarettes   Substance Use Topics    Alcohol use: Yes     Alcohol/week: 0.8 standard drinks     Types: 1 Glasses of wine per week      Current Outpatient Medications   Medication Sig    lisinopriL (PRINIVIL, ZESTRIL) 10 mg tablet Take 10 mg by mouth daily.  meloxicam (MOBIC) 15 mg tablet Take 1 Tablet by mouth daily.  tamsulosin (Flomax) 0.4 mg capsule Take 1 Capsule by mouth nightly.  atorvastatin (LIPITOR) 80 mg tablet Take 1 Tablet by mouth nightly.  atenoloL (TENORMIN) 50 mg tablet Take 1 Tablet by mouth daily.  doxycycline (PERIOSTAT) 20 mg tablet Take 1 Tablet by mouth two (2) times a day.  omeprazole (PRILOSEC) 20 mg capsule Take 20 mg by mouth daily.  lisinopriL (PRINIVIL, ZESTRIL) 10 mg tablet Take 1 Tab by mouth two (2) times a day.     doxycycline (MONODOX) 100 mg capsule Take 1 Cap by mouth two (2) times a day.  clindamycin (CLEOCIN) 150 mg capsule Take 2 Caps by mouth as needed (pre procedure). Pre procedure and dental procedure (Patient not taking: Reported on 8/17/2021)    docusate sodium (COLACE) 100 mg capsule Take 100 mg by mouth two (2) times a day. No current facility-administered medications for this visit. Allergies: Allergies   Allergen Reactions    Cephalexin Rash    Cephalosporins Rash    Contrast Agent [Iodine] Other (comments)     IV radio contrast caused urticaria years ago. Has had a CT scan since without problems.  Penicillins Rash       Review of Systems:  10 systems reviewed. See scanned sheet in \"Media\" section. See HPI for pertinent positives and negatives. Objective:     Visit Vitals  BP (!) 162/78 (BP 1 Location: Left upper arm, BP Patient Position: Sitting, BP Cuff Size: Adult)   Pulse 78   Temp 97.3 °F (36.3 °C) (Temporal)   Resp 16   Ht 5' 4\" (1.626 m)   Wt 64.4 kg (142 lb)   SpO2 98%   BMI 24.37 kg/m²       Physical Exam:  General appearance  Alert, cooperative, no distress, appears stated age   HEENT Anicteric           Lungs   Clear to auscultation bilaterally   Heart  Regular rate and rhythm. Abdomen   Soft, non-tender. Extremities no cyanosis or edema   Pulses 2+ right radial       Lymph nodes No palpable supraclavicular, axillary, or inguinal LAD. Neurologic Without overt sensory or motor deficit     Focused exam of the back reveals hard, nonmobile subcutaneous mass approximately 3 x 8 cm. There is no erythema.     Signed By: Nannette De La Rosa MD     August 23, 2021

## 2021-08-23 NOTE — TELEPHONE ENCOUNTER
Tried several times to reach the patient at the number provided. Continued to get a busy signal.  CAT scan has been ordered and core needle biopsy has been set up.

## 2021-08-31 NOTE — PROGRESS NOTES
Fine Needle Aspiration    Encounter Date: 8/31/2021    Preoperative diagnosis: Subcutaneous firm nonmobile back mass  Postoperative diagnosis: same  Procedure: ultrasound-guided core needle biopsy of above    Time out at performed by me (see consent form):  * Patient was identified by name and date of birth   * Agreement on procedure being performed was verified  * Risks and Benefits explained to the patient  * Procedure site verified and marked as necessary  * Patient was positioned for comfort  * Consent was signed and verified    Description of the procedure: After obtaining informed consent a time out was performed any the patient was taken to the procedure room and ultrasound was used to local localize the target and to avoid penetrating the thorax. Prep solution was then used to prep the skin and local anesthetic was infiltrated at the planned site of needle insertion. A stab incision was made with 11 blade. The mass was extremely firm and the dilator had to be used in order to initially penetrate it. This was done under ultrasound guidance. The core needle was then introduced under ultrasound guidance and 2 separate cores were taken. These were sent to pathology. The incision was closed with Steri-Strips and dressed with sterile gauze and tape. Disposition: We will await pathology results. Disposition:  Procedure well tolerated. Post-procedure pain scale: 0/10.     Polly Hodgkin, MD

## 2021-08-31 NOTE — Clinical Note
8/31/2021    Patient: Connor Peña   YOB: 1940   Date of Visit: 8/31/2021     Janelle Casey MD  [address] [place] [address] [phone]  [address] [place]    Dear Kajal Welch MD,      Thank you for referring Mr. Connor Peña to  [facility]  for evaluation. My notes for this consultation are attached. If you have questions, please do not hesitate to call me. I look forward to following your patient along with you.       Sincerely,    Edith Londono MD

## 2021-08-31 NOTE — PROGRESS NOTES
Identified pt with two pt identifiers(name and ). Reviewed record in preparation for visit and have obtained necessary documentation. Chief Complaint   Patient presents with    Surgical Follow-up     biopsy mass on back      Vitals:    21 1457   BP: (!) 150/60   Pulse: 65   Resp: 18   Temp: 98.4 °F (36.9 °C)   TempSrc: Oral   SpO2: 100%   Weight: 66.2 kg (146 lb)   Height: 5' 4\" (1.626 m)   PainSc:   0 - No pain       Health Maintenance Review: Patient reminded of \"due or due soon\" health maintenance. I have asked the patient to contact his/her primary care provider (PCP) for follow-up on his/her health maintenance. Coordination of Care Questionnaire:  :   1) Have you been to an emergency room, urgent care, or hospitalized since your last visit? If yes, where when, and reason for visit? no       Patient is accompanied by self I have received verbal consent from Austin So to discuss any/all medical information while they are present in the room.

## 2021-09-07 NOTE — TELEPHONE ENCOUNTER
Spoke with patient today regarding pathology results from biopsy of back mass. 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 He is not sure he would want to have surgery which would required rib resection etc.  He will likely want to discuss with an oncologist and he has a call out to a friend about whom he should see. We will help arrange that for him.

## 2021-09-08 NOTE — TELEPHONE ENCOUNTER
Pathology report faxed as patient requested to [facility] Dr. Blanquita Granda [phone]. Confirmation received.

## 2021-09-08 NOTE — TELEPHONE ENCOUNTER
PT WOULD LIKE PATHOLOGY RESULTS FAXED TO [facility]  [phone]

## 2021-09-20 NOTE — Clinical Note
9/20/2021    Patient: Octavia Ferrer   YOB: 1940   Date of Visit: 9/20/2021     Chel Casey MD  [address] [place] [address] [phone]  [address] [place]    Dear Darren Franklin MD,      Thank you for referring Mr. Octavia Ferrer to [address] for evaluation. My notes for this consultation are attached. If you have questions, please do not hesitate to call me. I look forward to following your patient along with you.       Sincerely,    Lia Neumann MD

## 2021-09-20 NOTE — PROGRESS NOTES
To: Rehana Casey MD   CC:  Mekhi Álvarez MD    From: Sarbjit Bee MD    Thank you for referring Austin So. I have really enjoyed meeting him. Encounter Date: 9/20/2021    Subjective:      Austin So is a [de-identified] y.o. male presents after having a firm mass on his back biopsied via core needle. The lesion was concerning and unfortunately appears to be a leiomyosarcoma. I did have him undergo CAT scan and it is adjacent to the ribs, although there does not appear to be any intrathoracic component. He is not inclined to be aggressive in terms of surgery. He is interested in speaking with an oncologist however. Objective:     General:  alert, cooperative, no distress, appears stated age   Back:   The mass is no difference. There is approximately 10 x 4 cm between the spine and the inferior aspect of the right scapula. The biopsy site is well-healed. 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 No obvious intrathoracic involvement on CAT scan. Plan:     I discussed the case with Dr. Eliza Angulo today. We will get him an appointment ASAP. Will also speak with Corrie Byers from radiation oncology. The case will be presented at cancer conference next week.     Sarbjit Bee MD

## 2021-09-20 NOTE — PROGRESS NOTES
Identified pt with two pt identifiers(name and ). Reviewed record in preparation for visit and have obtained necessary documentation. Chief Complaint   Patient presents with    Surgical Follow-up     needle biopsy results, having problems with getting oncologist.         Pt states: symptoms remain the same, mild aches, has tried looking for oncologist but has had no success and the soonest appointment he found was 2022. Patient would like assistance from dr. Eliecer Owens and a second opinion on course of action. Vitals:    21 1354   BP: (!) 167/70   Pulse: 64   Resp: 18   Temp: 98 °F (36.7 °C)   TempSrc: Temporal   SpO2: 99%   Weight: 66.2 kg (145 lb 14.4 oz)   Height: 5' 4\" (1.626 m)   PainSc:   0 - No pain           Health Maintenance Review: Patient reminded of \"due or due soon\" health maintenance. I have asked the patient to contact his/her primary care provider (PCP) for follow-up on his/her health maintenance. Coordination of Care Questionnaire:  :   1) Have you been to an emergency room, urgent care, or hospitalized since your last visit? If yes, where when, and reason for visit? no       2. Have seen or consulted any other health care provider since your last visit? If yes, where when, and reason for visit? NO      Patient is accompanied by self I have received verbal consent from Gordo Chris to discuss any/all medical information while they are present in the room.

## 2021-09-23 NOTE — TELEPHONE ENCOUNTER
New Patient    Patient Name: Mariangel Raya    :[birth year]    Physician Referral: Michael Beavers    Phone #:Call Patient directly    Records: in system -

## 2021-09-27 NOTE — PROGRESS NOTES
Mari Arce is a [de-identified] y.o. male here for new patient appt for leiomyosarcoma. Referred by Dr Kay West    1. Have you been to the ER, urgent care clinic since your last visit? Hospitalized since your last visit? New Pt    2. Have you seen or consulted any other health care providers outside of the [address] [place] [address] since your last visit? Include any pap smears or colon screening. New Pt    Pt here with wife. Bx for mass on back was done 8/31/21. He has not talked to Dr Willi Billings yet and does not have an appt. Pt is having some more pain in his right side under mass recently. Pt states his mass has been there for awhile.

## 2021-09-30 NOTE — PATIENT INSTRUCTIONS
Please call [phone] if you have not been set up for your port and imaging studies by Tuesday 10/5/21. Or you can call now to do. You will need to take prednisone 50mg 13 hours prior to scan, 7 hours prior to scan, and 1 hour prior to scan. Also please take benadryl 50mg 1 hour prior to scan. We will send over a numbing cream to your pharmacy for your port to apply 30 min prior to infusion appointments. We will send over anti-nausea medication in case you experience nausea at home after treatments. You will be referred to [facility] for radiation consult. Once we know when radiation is starting we can line up chemotherapy to start the same day. Please call us when you have this date. [phone]. On the day of your chemo you will report to the infusion center, [facility] [address], the nurses will access your port and draw labs, they will then send you over to our office, [facility] [address]. We will look at the labs, discuss any symptoms/ potential side effects, and answer any questions. We will then send you back to the infusion center to complete your infusion. Please take all of your medications as prescribed and eat breakfast prior to your arrival. 
 
Mindy Drummer is not provided, but there is limited snacks/drinks available for the patient only. Please be mindful that space is limited therefore please allow 1 person to accompany you to your treatments. Also with [id number] policy may change. Colleen Guardado

## 2021-09-30 NOTE — PROGRESS NOTES
[address] [facility] [address], [address], [address] [place] [address], [address] [place] [address]  [phone]       Oncology Note        Patient: Rafy Sandhu MRN: [id number]  SSN: [id number]    YOB: 1940  Age: [de-identified] y.o. Sex: male        Diagnosis:      1. Leiomyosarcoma of the chest wall    Subjective:      Rafy Sandhu is a [de-identified] y.o. 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 He noticed pain and discomfort in the right posterolateral chest wall for the last 4 weeks. The pain was characterized as sharp at times, radiating along the ribs. His wife noticed an a mass on the chest wall. He saw Dr. Rj Machado. 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 He comes in for a visit for discussion of treatment options. He went to mention that he is suffering the severe mitral stenosis, not a candidate for TAVR, ruptured chordae tendeniae. He is not enthusiastic about aggressive treatment of this tumor. He does not want to see Thoracic Surgery.         Review of Systems:    Constitutional: negative  Eyes: negative  Ears, Nose, Mouth, Throat, and Face: negative  Respiratory: negative  Cardiovascular: negative  Gastrointestinal: negative  Genitourinary:negative  Integument/Breast: negative  Hematologic/Lymphatic: negative  Musculoskeletal:negative  Neurological: negative      Past Medical History:   Diagnosis Date    Anxiety     years ago    Arrhythmia     SVT    Arthritis     DJD knees    Cancer [facility]) August 2021    probable lieomyosarcoma    Chronic bronchitis ([address]     Dental disorder periodontitis    Elevated PSA     Enlarged prostate     GERD (gastroesophageal reflux disease)     Gingivitis     Hepatitis A antibody positive     Hepatitis B antibody positive     Hypercholesterolemia     Hypertension     Liver disease     Mitral chordae rupture (HCC)     Other ill-defined conditions(799.89) increased cholesterol    Other ill-defined conditions(799.89)     ruptured chordea tinea right mitral valve    Other ill-defined conditions(799.89)     left ventricular hypertrophy    Other ill-defined conditions(799.89)     hepatitis A and B exposure    Other ill-defined [id number](793.71)     skin lichen planus    Stroke ([facility] [address] 2014    stable    Tinnitus      Past Surgical History:   Procedure Laterality Date    HX CHOLECYSTECTOMY  2014    laparoscopic cholecystectomy    HX COLONOSCOPY  2014    polyps    HX HEENT      T & A    HX HERNIA REPAIR      HX KNEE ARTHROSCOPY  2002    bilateral    HX OTHER SURGICAL      right inguinal hernia repair    HX OTHER SURGICAL      colonoscopy - diverticulosis/no polyps    HX OTHER SURGICAL  1969    right forearm fragment wound    HX PROSTATE SURGERY      prostate Bx X2    HX UROLOGICAL      BPH    NEUROLOGICAL PROCEDURE UNLISTED      poor recent memory & increased carelessness    MD CARDIAC SURG PROCEDURE UNLIST  Dec 2020    significant aortic stenosis ? in need of TAVR? ?? Family History   Problem Relation Age of Onset    Lung Disease Mother         pneumococcal sepsis/pneumonia    Diabetes Mother     Kidney Disease Father     Heart Disease Father         aortic valve disease    Headache Maternal Uncle     Anesth Problems Neg Hx      Social History     Tobacco Use    Smoking status: Current Every Day Smoker     Packs/day: 1.00     Years: 62.00     Pack years: 62.00    Smokeless tobacco: Never Used    Tobacco comment: cigarettes   Substance Use Topics    Alcohol use: Yes     Alcohol/week: 1.0 standard drinks     Types: 1 Glasses of wine per week      Prior to Admission medications    Medication Sig Start Date End Date Taking? Authorizing Provider   celecoxib (CELEBREX PO) Take  by mouth. Yes Provider, Historical   lisinopriL (PRINIVIL, ZESTRIL) 10 mg tablet Take 5 mg by mouth daily.    Yes Provider, Historical   meloxicam (MOBIC) 15 mg tablet Take 1 Tablet by mouth daily. 8/12/21  Yes Vlad Casey MD   tamsulosin (Flomax) 0.4 mg capsule Take 1 Capsule by mouth nightly. 8/12/21  Yes Vlad Casey MD   atorvastatin (LIPITOR) 80 mg tablet Take 1 Tablet by mouth nightly. 8/12/21  Yes Vlad Casey MD   atenoloL (TENORMIN) 50 mg tablet Take 1 Tablet by mouth daily. 8/12/21  Yes Vlad Casey MD   doxycycline (PERIOSTAT) 20 mg tablet Take 1 Tablet by mouth two (2) times a day. 8/12/21  Yes Vlad Casey MD   omeprazole (PRILOSEC) 20 mg capsule Take 20 mg by mouth daily. Yes Provider, Historical   doxycycline (MONODOX) 100 mg capsule Take 1 Cap by mouth two (2) times a day. 5/26/20  Yes Vlad Casey MD   docusate sodium (COLACE) 100 mg capsule Take 100 mg by mouth two (2) times a day. Yes Provider, Historical            Allergies   Allergen Reactions    Cephalexin Rash    Cephalosporins Rash    Contrast Agent [Iodine] Other (comments)     IV radio contrast caused urticaria years ago. Has had a CT scan since without problems.  Penicillins Rash           Objective:     Vitals:    09/30/21 0926   BP: (!) 143/63   Pulse: 70   Temp: 98.1 °F (36.7 °C)   TempSrc: Temporal   SpO2: 97%   Weight: 147 lb (66.7 kg)   Height: 5' 4\" (1.626 m)            Physical Exam:  GENERAL: alert, cooperative, no distress, appears stated age  EYE: conjunctivae/corneas clear. PERRL, EOM's intact  LYMPHATIC: Cervical, supraclavicular, and axillary nodes normal.   THROAT & NECK: normal and no erythema or exudates noted. CHEST: 9 cm mass, firm and immobile. On the right postero-lateral chest wall  LUNG: clear to auscultation bilaterally  HEART: regular rate and rhythm, S1, S2 normal, no murmur, click, rub or gallop  ABDOMEN: soft, non-tender. Bowel sounds normal. No masses,  no organomegaly  EXTREMITIES:  extremities normal, atraumatic, no cyanosis or edema  SKIN: Normal.  NEUROLOGIC: AOx3.  Gait normal. Reflexes and motor strength normal and symmetric. Cranial nerves 2-12 and sensation grossly intact. CT Results (most recent):  Results from Hospital Encounter encounter on 08/25/21    CT CHEST WO CONT    Narrative  INDICATION: hard fixed mass below right scapula worrisome for sarcoma - prone  position    COMPARISON: None    TECHNIQUE:  Noncontrast 5 mm axial images were obtained through the chest  (reported IV contrast allergy and connect care). CT dose reduction was achieved  through use of a standardized protocol tailored for this examination and  automatic exposure control for dose modulation. Note: Patient scanned in prone position. FINDINGS:    THYROID: 1.7 cm low-density right lobe nodule. MEDIASTINUM/JC: No mass or lymphadenopathy. HEART/PERICARDIUM: No cardiomegaly or pericardial effusion. Coronary  atherosclerosis. LUNGS/PLEURA: No consolidation, edema, or effusion. No pneumothorax. 2 mm  calcification along the left major fissure laterally. No suspicious pulmonary  nodule. INCIDENTALLY IMAGED UPPER ABDOMEN: Subcentimeter hypodensity right hepatic lobe  too small to characterize. 15 mm fat density focus in the pancreatic head. No  adrenal mass. BONES: Degenerative changes throughout the thoracic spine. No destructive bone  lesion. ADDITIONAL COMMENTS:  Area of palpable concern inferior to the right scapula in  the right posterior back corresponds to a oval, heterogeneous soft tissue mass  4.9 x 2.2 x 6.3 cm posterior to the right seventh, eighth, and ninth ribs. No  underlying rib abnormality. No associated calcifications or fat components. Impression  1. Area of palpable concern in the right posterior back corresponds to a  subcutaneous, oval soft tissue mass 4.9 x 2.2 x 6.3 cm posterior to the right  seventh-ninth ribs. No associated osseous abnormality. 2. No lymphadenopathy or suspicious pulmonary nodule. 3. 1.7 cm right thyroid nodule. 23X          Assessment:     1.  Leiomyosarcoma of the chest wall    T2 Nx  9 cm    He does not want a wide resection of the tumor. I offered him concurrent chemotherapy with radiation. In view of his advanced age, I offered single agent Adriamycin. Complete staging with CT abd/pelvis    I spent 65 minute with the patient in a face-to-face encounter. I explained him the stage of the disease, pathophysiology of the disease and the treatment approaches. I answered all his questions. More than 50% of the time was utilized in education, counseling and co-ordination of care. Plan:       > Discuss in the tumor board  > Port placement  > labs  > 2-D Echo - from Cardiology office  > CT abd/pelvis  > Refer to Rad-Onc      Signed By: Kriss Huynh MD     September 30, 2021         CC. Amy Day MD  CC. Shasta Caro MD  CC.  Jose Molina MD

## 2021-09-30 NOTE — PROGRESS NOTES
Pt would like to wait until he sees Radiation Oncologist before he makes any decision. He will need ECHO, CT CHEST ABD PELV with premedication, port, emla and nausea meds.   ERIS FROM DR Lopez Manley REFERRAL TO RADIATION ONCOLOGY

## 2021-09-30 NOTE — LETTER
9/30/2021    Patient: Jeronimo Hamilton   YOB: 1940   Date of Visit: 9/30/2021     Donna Casey MD  [address] [place] [address] [phone]  [place] [place]    Dear Kajal Odonnell MD,      Thank you for referring Mr. Jeronimo Hamilton to [address] [place] [address] for evaluation. My notes for this consultation are attached. If you have questions, please do not hesitate to call me. I look forward to following your patient along with you.       Sincerely,    Keily Mccormick MD

## 2021-09-30 NOTE — PROGRESS NOTES
Oncology Social Work  Psychosocial Assessment    Reason for Assessment:      [] Social Work Referral [x] Initial Assessment [x] New Diagnosis [] Other    Advance Care Planning:  No flowsheet data found. Sources of Information:    [x]Patient  [x]Family  [x]Staff  [x]Medical Record    Mental Status:    [x]Alert  []Lethargic  []Unresponsive   [] Unable to assess   Oriented to:  [x]Person  [x]Place  [x]Time  [x]Situation      Relationship Status:  []Single  [x]  []Significant Other/Life Partner  []  []  []    Living Circumstances:  []Lives Alone  [x]Family/Significant Other in Household  []Roommates  []Children in the Home  []Paid Caregivers  []Assisted Living Facility/Group Home  []Skilled [address]  []Homeless  []Incarcerated  []Environmental/Care Concerns  []Other:    Employment Status:  []Employed Full-time []Employed Part-time []Homemaker  [x] Retired [] Short-Term Disability [] [facility]  [] Unemployed     Barriers to Learning:    []Language  []Developmental  []Cognitive  []Altered Mental Status  []Visual/Hearing Impairment  []Unable to Read/Write  []Motivational  [] Challenges Understanding Medical Jargon [x]No Barriers Identified      Financial/Legal Concerns:    []Uninsured  []Limited Income/Resources  []Non-Citizen  []Food Insecurity []No Concerns Identified   []Other:    Sikhism/Spiritual/Existential:  Does patient have any spiritual or Adventist beliefs? [x] Yes [] No  Is the patient involved in a spiritual, hank or Adventist community?  [] Yes [] No  Patient expressing spiritual/existential angst? [] Yes [] No  Notes:    Support System:    Identified Support Person/Group:  []Strong  [x]Fair  []Limited    Coping with Illness:   [x]  Coping Well  [] Challenges Coping with Serious Illness [] Situational Depression [] Situational Anxiety [] Anticipatory Grief  [] Recent Loss [] Caregiver Bendena            Narrative: Met with pt and his wife, Rebecca Whitlock of 64 years, to introduce social work navigator role and supports. PT has a son and a dtr that both live in the area. 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 PT retired in 2004. PT heart MD - Jose Gonzalez. PT to have treatment 1 day every 21 days for 2-3 times, needs to see radiation oncologist - with intention to shrink and make surgery more successful. PT has or had a farm[place]farm house [place] - his dtr works at [employer] and was researching the farm home and it was mix up on pictures and the dtr was able to correct. PT states they spent 2.5/3.5 years looking for a home without steps and they found [place] [address] Plan:  1. Introduce self and role of the  in the [employer]. 2. Informed the patient of the [facility] and available resources there. 3. Continue to meet with the patient when he returns to the clinic for ongoing assessment of the patient's adjustment to his diagnosis and treatment. 4. Ongoing psychosocial support as desired by patient. Referral:   Complementary therapies referral      Laura Gale.  Kris Page, CORTES/LEMUEL  Supervisee in Social Work

## 2021-10-12 PROBLEM — I35.0: Status: ACTIVE | Noted: 2021-01-01

## 2021-10-12 NOTE — PROGRESS NOTES
Chief Complaint   Patient presents with    Back Pain     f/u; Pt states there's a cancerous tumor found on his back in Aug, 2021     Pt has an appt with [facility] for consultation of recent diagnosis of leiomyosarcoma. Pt was seen by Dr. Aida Blood, is seeking a second opinion. Pt consents to a flu vaccine today. This is the Subsequent Medicare Annual Wellness Exam, performed 12 months or more after the Initial AWV or the last Subsequent AWV    I have reviewed the patient's medical history in detail and updated the computerized patient record. Assessment/Plan   Education and counseling provided:  Are appropriate based on today's review and evaluation    1. Needs flu shot  -     INFLUENZA, HIGH DOSE SEASONAL  2. Medicare annual wellness visit, subsequent       Depression Risk Factor Screening     3 most recent PHQ Screens 10/12/2021   Little interest or pleasure in doing things Not at all   Feeling down, depressed, irritable, or hopeless Not at all   Total Score PHQ 2 0       Alcohol Risk Screen    Do you average more than 1 drink per night or more than 7 drinks a week: No    In the past three months have you have had more than 4 drinks containing alcohol on one occasion: No        Functional Ability and Level of Safety    Hearing: pt has noted some change in hearing      Activities of Daily Living: The home contains: handrails and grab bars  Patient does total self care      Ambulation: with no difficulty     Fall Risk:  Fall Risk Assessment, last 12 mths 10/12/2021   Able to walk? Yes   Fall in past 12 months? 0   Do you feel unsteady? 0   Are you worried about falling 0   Number of falls in past 12 months -   Fall with injury?  -      Abuse Screen:  Patient is not abused       Cognitive Screening    Has your family/caregiver stated any concerns about your memory: yes - pt has noted some difficulty with short term memory     Health Maintenance Due     Health Maintenance Due   Topic Date Due    Low dose CT lung screening  Never done    Flu Vaccine (1) 09/01/2021       Patient Care Team   Patient Care Team:  Destiney Francis MD as PCP - General (Family Medicine)  Tatyana Casey MD as PCP - [facility] [place] Provider  Bhuipnder Welch MD as Surgeon (General Surgery)    History     Patient Active Problem List   Diagnosis Code    Essential hypertension I10    Hypercholesterolemia E78.00    Hyperglycemia R73.9    Paresthesia of upper limb R20.2    Calcific aortic valve stenosis I35.0     Past Medical History:   Diagnosis Date    Anxiety     years ago    Arrhythmia     SVT    Arthritis     DJD knees    Cancer [facility]) August 2021    probable lieomyosarcoma    Chronic bronchitis ([address]     Dental disorder periodontitis    Elevated PSA     Enlarged prostate     GERD (gastroesophageal reflux disease)     Gingivitis     Hepatitis A antibody positive     Hepatitis B antibody positive     Hypercholesterolemia     Hypertension     Liver disease     Mitral chordae rupture ([address]     Other ill-defined conditions(799.89)     increased cholesterol    Other ill-defined conditions(799.89)     ruptured chordea tinea right mitral valve    Other ill-defined conditions(799.89)     left ventricular hypertrophy    Other ill-defined conditions(799.89)     hepatitis A and B exposure    Other ill-defined [id number](668.31)     skin lichen planus    Stroke ([address] 2014    stable    Tinnitus       Past Surgical History:   Procedure Laterality Date    HX CHOLECYSTECTOMY  2014    laparoscopic cholecystectomy    HX COLONOSCOPY  2014    polyps    HX HEENT      T & A    HX HERNIA REPAIR      HX KNEE ARTHROSCOPY  2002    bilateral    HX OTHER SURGICAL      right inguinal hernia repair    HX OTHER SURGICAL      colonoscopy - diverticulosis/no polyps    HX OTHER SURGICAL  1969    right forearm fragment wound    HX PROSTATE SURGERY      prostate Bx X2    HX UROLOGICAL      BPH    NEUROLOGICAL PROCEDURE UNLISTED      poor recent memory & increased carelessness    GA CARDIAC SURG PROCEDURE UNLIST  Dec 2020    significant aortic stenosis ? in need of TAVR? ??     Current Outpatient Medications   Medication Sig Dispense Refill    lisinopriL (PRINIVIL, ZESTRIL) 10 mg tablet Take 5 mg by mouth daily.  meloxicam (MOBIC) 15 mg tablet Take 1 Tablet by mouth daily. 90 Tablet 3    tamsulosin (Flomax) 0.4 mg capsule Take 1 Capsule by mouth nightly. 90 Capsule 3    atorvastatin (LIPITOR) 80 mg tablet Take 1 Tablet by mouth nightly. 90 Tablet 3    atenoloL (TENORMIN) 50 mg tablet Take 1 Tablet by mouth daily. 90 Tablet 3    doxycycline (PERIOSTAT) 20 mg tablet Take 1 Tablet by mouth two (2) times a day. 180 Tablet 3    omeprazole (PRILOSEC) 20 mg capsule Take 20 mg by mouth daily.  doxycycline (MONODOX) 100 mg capsule Take 1 Cap by mouth two (2) times a day. 60 Cap 3    docusate sodium (COLACE) 100 mg capsule Take 100 mg by mouth two (2) times a day.  celecoxib (CELEBREX PO) Take  by mouth. Allergies   Allergen Reactions    Cephalexin Rash    Cephalosporins Rash    Penicillins Rash       Family History   Problem Relation Age of Onset    Lung Disease Mother         pneumococcal sepsis/pneumonia    Diabetes Mother     Kidney Disease Father     Heart Disease Father         aortic valve disease    Headache Maternal Uncle     Anesth Problems Neg Hx      Social History     Tobacco Use    Smoking status: Current Every Day Smoker     Packs/day: 1.00     Years: 62.00     Pack years: 62.00    Smokeless tobacco: Never Used    Tobacco comment: cigarettes   Substance Use Topics    Alcohol use:  Yes     Alcohol/week: 1.0 standard drinks     Types: 1 Glasses of wine per week         Randolph Linda MD

## 2021-10-12 NOTE — PATIENT INSTRUCTIONS
Vaccine Information Statement    Influenza (Flu) Vaccine (Inactivated or Recombinant): What You Need to Know    Many vaccine information statements are available in Portuguese and other languages. See www.immunize.org/vis. Hojas de información sobre vacunas están disponibles en español y en muchos otros idiomas. Visite www.immunize.org/vis. 1. Why get vaccinated? Influenza vaccine can prevent influenza (flu). Flu is a contagious disease that spreads around the [facility] every year, usually between October and May. Anyone can get the flu, but it is more dangerous for some people. Infants and young children, people 72 years and older, pregnant people, and people with certain health conditions or a weakened immune system are at greatest risk of flu complications. Pneumonia, bronchitis, sinus infections, and ear infections are examples of flu-related complications. If you have a medical condition, such as heart disease, cancer, or diabetes, flu can make it worse. Flu can cause fever and chills, sore throat, muscle aches, fatigue, cough, headache, and runny or stuffy nose. Some people may have vomiting and diarrhea, though this is more common in children than adults. In an average year, thousands of people in the [facility] die from flu, and many more are hospitalized. Flu vaccine prevents millions of illnesses and flu-related visits to the doctor each year. 2. Influenza vaccines     CDC recommends everyone 6 months and older get vaccinated every flu season. Children 6 months through 6years of age may need 2 doses during a single flu season. Everyone else needs only 1 dose each flu season. It takes about 2 weeks for protection to develop after vaccination. There are many flu viruses, and they are always changing. Each year a new flu vaccine is made to protect against the influenza viruses believed to be likely to cause disease in the upcoming flu season.  Even when the vaccine doesnt exactly match these viruses, it may still provide some protection. Influenza vaccine does not cause flu. Influenza vaccine may be given at the same time as other vaccines. 3. Talk with your health care provider    Tell your vaccination provider if the person getting the vaccine:   Has had an allergic reaction after a previous dose of influenza vaccine, or has any severe, life-threatening allergies    Has ever had Guillain-Barré Syndrome (also called GBS)    In some cases, your health care provider may decide to postpone influenza vaccination until a future visit. Influenza vaccine can be administered at any time during pregnancy. People who are or will be pregnant during influenza season should receive inactivated influenza vaccine. People with minor illnesses, such as a cold, may be vaccinated. People who are moderately or severely ill should usually wait until they recover before getting influenza vaccine. Your health care provider can give you more information. 4. Risks of a vaccine reaction     Soreness, redness, and swelling where the shot is given, fever, muscle aches, and headache can happen after influenza vaccination.  There may be a very small increased risk of Guillain-Barré Syndrome (GBS) after inactivated influenza vaccine (the flu shot). Ryan Mirza children who get the flu shot along with pneumococcal vaccine (PCV13) and/or DTaP vaccine at the same time might be slightly more likely to have a seizure caused by fever. Tell your health care provider if a child who is getting flu vaccine has ever had a seizure. People sometimes faint after medical procedures, including vaccination. Tell your provider if you feel dizzy or have vision changes or ringing in the ears. As with any medicine, there is a very remote chance of a vaccine causing a severe allergic reaction, other serious injury, or death. 5. What if there is a serious problem?     An allergic reaction could occur after the vaccinated person leaves the clinic. If you see signs of a severe allergic reaction (hives, swelling of the face and throat, difficulty breathing, a fast heartbeat, dizziness, or weakness), call 9-1-1 and get the person to the nearest hospital.    For other signs that concern you, call your health care provider. Adverse reactions should be reported to the Vaccine Adverse Event Reporting System (VAERS). Your health care provider will usually file this report, or you can do it yourself. Visit the VAERS website at www.vaers. [facility].gov or call 4-114.373.6407. VAERS is only for reporting reactions, and VAERS staff members do not give medical advice. 6. The National Vaccine Injury Compensation Program    The [facility] Vaccine Injury Compensation Program (VICP) is a federal program that was created to compensate people who may have been injured by certain vaccines. Claims regarding alleged injury or death due to vaccination have a time limit for filing, which may be as short as two years. Visit the VICP website at www.[facility].gov/vaccinecompensation or call 9-459.214.3741 to learn about the program and about filing a claim. 7. How can I learn more?  Ask your health care provider.  Call your local or state health department.  Visit the website of the Food and Drug Administration (FDA) for vaccine package inserts and additional information at www.fda.gov/vaccines-blood-biologics/vaccines.  Contact the Centers for Disease Control and Prevention (CDC):  - Call 6-378.853.4652 (1-800-CDC-INFO) or  - Visit CDCs influenza website at www.cdc.gov/flu. Vaccine Information Statement   Inactivated Influenza Vaccine   8/6/2021  42 DK Quijano [id number]   Department of Health and Human Services  Centers for Disease Control and Prevention    Office Use Only      Medicare Wellness Visit, Male    The best way to live healthy is to have a lifestyle where you eat a well-balanced diet, exercise regularly, limit alcohol use, and quit all forms of tobacco/nicotine, if applicable. Regular preventive services are another way to keep healthy. Preventive services (vaccines, screening tests, monitoring & exams) can help personalize your care plan, which helps you manage your own care. Screening tests can find health problems at the earliest stages, when they are easiest to treat. [facility] follows the current, evidence-based guidelines published by the [facility] ([facility]) when recommending preventive services for our patients. Because we follow these guidelines, sometimes recommendations change over time as research supports it. (For example, a prostate screening blood test is no longer routinely recommended for men with no symptoms). Of course, you and your doctor may decide to screen more often for some diseases, based on your risk and co-morbidities (chronic disease you are already diagnosed with). Preventive services for you include:  - Medicare offers their members a free annual wellness visit, which is time for you and your primary care provider to discuss and plan for your preventive service needs. Take advantage of this benefit every year!  -All adults over age 72 should receive the recommended pneumonia vaccines. Current USPSTF guidelines recommend a series of two vaccines for the best pneumonia protection.   -All adults should have a flu vaccine yearly and tetanus vaccine every 10 years.  -All adults age 48 and older should receive the shingles vaccines (series of two vaccines).        -All adults age 38-68 who are overweight should have a diabetes screening test once every three years.   -Other screening tests & preventive services for persons with diabetes include: an eye exam to screen for diabetic retinopathy, a kidney function test, a foot exam, and stricter control over your cholesterol.   -Cardiovascular screening for adults with routine risk involves an electrocardiogram (ECG) at intervals determined by the provider.   -Colorectal cancer screening should be done for adults age 54-65 with no increased risk factors for colorectal cancer. There are a number of acceptable methods of screening for this type of cancer. Each test has its own benefits and drawbacks. Discuss with your provider what is most appropriate for you during your annual wellness visit. The different tests include: colonoscopy (considered the best screening method), a fecal occult blood test, a fecal DNA test, and sigmoidoscopy.  -All adults born between [facility] should be screened once for Hepatitis C.  -An Abdominal Aortic Aneurysm (AAA) Screening is recommended for men age 73-68 who has ever smoked in their lifetime.      Here is a list of your current Health Maintenance items (your personalized list of preventive services) with a due date:  Health Maintenance Due   Topic Date Due    Smoker or Former Smoker - [address]  Never done    Yearly Flu Vaccine (1) 09/01/2021

## 2021-10-12 NOTE — PROGRESS NOTES
1. Have you been to the ER, urgent care clinic since your last visit? Hospitalized since your last visit? No    2. Have you seen or consulted any other health care providers outside of the [address] [place] [address] since your last visit? Include any pap smears or colon screening. No    Health Maintenance Due   Topic Date Due    Low dose CT lung screening  Never done    Medicare Yearly Exam  08/25/2021    Flu Vaccine (1) 09/01/2021     Chief Complaint   Patient presents with    Back Pain     f/u; Pt states there's a cancerous tumor found on his back in Aug, 2021     Pt would like to receive Flu shot.

## 2021-10-15 NOTE — TELEPHONE ENCOUNTER
Pt calling and states that his prescription was not ordered     Requested Prescriptions     Pending Prescriptions Disp Refills    celecoxib (CELEBREX) 200 mg capsule 180 Capsule 3     Sig: Take 1 Capsule by mouth two (2) times a day for 90 days.      Thank You

## 2021-10-18 NOTE — TELEPHONE ENCOUNTER
Patient calling to let Dr Casey know that [facility] is not honoring refill of celebrex until the order is cancelled at [facility].  Please call patient when this has been done at [phone]

## 2021-10-18 NOTE — TELEPHONE ENCOUNTER
Called [facility] and spoke with Priscila Ham (tech) and she verified that Rx and refills will be cancelled.  verified. Informed pt that [facility] has been cancelled. Understanding vocalized. Called [facility] and spoke with Codi Clarke and she will have the pharmacy tech to call me back about the Rx (Celebrex).

## 2021-10-21 NOTE — TELEPHONE ENCOUNTER
[facility] is calling in reference to a RX; Pharmacy has a questions about RX's    celecoxib (CELEBREX) 200 mg capsule        Please call Pharmacy     Thank You
Meloxicam discontinued, Celebrex is the replacement. Please advise.
Pita Camacho (pharmacist) notified per Dr. Javier Hollis message. Understanding vocalized.
No

## 2021-10-24 NOTE — TELEPHONE ENCOUNTER
----- Message from Gardenia Anna sent at 10/21/2021  8:56 AM EDT -----  Regarding: Prescription Question  Contact: [phone]  More information needed: Please call your doctor today about the following medicine(s):            From [facility]                    We are missing important information about your prescribed medication            Monroe GREENWOOD,    We have reached out to your doctor multiple times with no response regarding missing information about the medication listed below. Please get in touch with your doctor and ask they reply to the outreach received regarding your medication. Rx beginning with C 200MG CAP  Rx #####[id number]     This is a generic version of ZACHARIAH. Review your order status    Sincerely,  Your Pharmacy Member Services Team              Please don't reply to this email. We want to help you, but these messages aren't monitored.  If you have questions, please use our

## 2021-10-26 NOTE — PROGRESS NOTES
Called pt to inquire on if he has made decisions on his care plan. HIPAA verified by two patient identifiers. He has not and he has his first appointment mika. With [address] for opinion.   I asked for him to return a call if we are able to assist.

## 2022-01-01 ENCOUNTER — HOME CARE VISIT (OUTPATIENT)
Dept: HOSPICE | Facility: HOSPICE | Age: 82
End: 2022-01-01
Payer: MEDICARE

## 2022-01-01 ENCOUNTER — HOME CARE VISIT (OUTPATIENT)
Dept: SCHEDULING | Facility: HOME HEALTH | Age: 82
End: 2022-01-01
Payer: MEDICARE

## 2022-01-01 ENCOUNTER — TELEPHONE (OUTPATIENT)
Dept: FAMILY MEDICINE CLINIC | Age: 82
End: 2022-01-01

## 2022-01-01 ENCOUNTER — OFFICE VISIT (OUTPATIENT)
Dept: PALLATIVE CARE | Age: 82
End: 2022-01-01
Payer: MEDICARE

## 2022-01-01 ENCOUNTER — OFFICE VISIT (OUTPATIENT)
Dept: FAMILY MEDICINE CLINIC | Age: 82
End: 2022-01-01
Payer: MEDICARE

## 2022-01-01 ENCOUNTER — HOSPITAL ENCOUNTER (OUTPATIENT)
Dept: CT IMAGING | Age: 82
Discharge: HOME | End: 2022-06-09
Attending: RADIOLOGY
Payer: MEDICARE

## 2022-01-01 ENCOUNTER — HOSPITAL ENCOUNTER (INPATIENT)
Age: 82
LOS: 7 days | End: 2022-07-20
Attending: FAMILY MEDICINE | Admitting: FAMILY MEDICINE

## 2022-01-01 ENCOUNTER — TELEPHONE (OUTPATIENT)
Dept: PALLATIVE CARE | Age: 82
End: 2022-01-01

## 2022-01-01 ENCOUNTER — HOSPICE ADMISSION (OUTPATIENT)
Dept: HOSPICE | Facility: HOSPICE | Age: 82
End: 2022-01-01
Payer: MEDICARE

## 2022-01-01 ENCOUNTER — TRANSCRIBE ORDER (OUTPATIENT)
Dept: SCHEDULING | Age: 82
End: 2022-01-01

## 2022-01-01 VITALS
RESPIRATION RATE: 16 BRPM | OXYGEN SATURATION: 97 % | HEART RATE: 64 BPM | BODY MASS INDEX: 23.93 KG/M2 | SYSTOLIC BLOOD PRESSURE: 146 MMHG | WEIGHT: 140.2 LBS | HEIGHT: 64 IN | DIASTOLIC BLOOD PRESSURE: 64 MMHG | TEMPERATURE: 98 F

## 2022-01-01 VITALS
TEMPERATURE: 98.3 F | RESPIRATION RATE: 20 BRPM | HEART RATE: 122 BPM | OXYGEN SATURATION: 95 % | SYSTOLIC BLOOD PRESSURE: 140 MMHG | DIASTOLIC BLOOD PRESSURE: 68 MMHG

## 2022-01-01 VITALS
OXYGEN SATURATION: 93 % | RESPIRATION RATE: 20 BRPM | DIASTOLIC BLOOD PRESSURE: 73 MMHG | BODY MASS INDEX: 24.07 KG/M2 | HEART RATE: 137 BPM | SYSTOLIC BLOOD PRESSURE: 123 MMHG | HEIGHT: 64 IN | TEMPERATURE: 95.7 F

## 2022-01-01 VITALS
SYSTOLIC BLOOD PRESSURE: 140 MMHG | DIASTOLIC BLOOD PRESSURE: 58 MMHG | RESPIRATION RATE: 16 BRPM | OXYGEN SATURATION: 93 % | HEART RATE: 105 BPM

## 2022-01-01 VITALS
DIASTOLIC BLOOD PRESSURE: 58 MMHG | HEART RATE: 87 BPM | SYSTOLIC BLOOD PRESSURE: 96 MMHG | TEMPERATURE: 97.7 F | RESPIRATION RATE: 24 BRPM | OXYGEN SATURATION: 89 %

## 2022-01-01 VITALS
RESPIRATION RATE: 18 BRPM | SYSTOLIC BLOOD PRESSURE: 120 MMHG | OXYGEN SATURATION: 98 % | HEART RATE: 110 BPM | DIASTOLIC BLOOD PRESSURE: 66 MMHG

## 2022-01-01 DIAGNOSIS — C49.6 MALIGNANT NEOPLASM OF SOFT TISSUE OF BACK (HCC): Primary | ICD-10-CM

## 2022-01-01 DIAGNOSIS — G89.3 CANCER ASSOCIATED PAIN: ICD-10-CM

## 2022-01-01 DIAGNOSIS — K59.03 DRUG-INDUCED CONSTIPATION: ICD-10-CM

## 2022-01-01 DIAGNOSIS — R07.89 RIGHT-SIDED CHEST WALL PAIN: ICD-10-CM

## 2022-01-01 DIAGNOSIS — R45.1 RESTLESSNESS AND AGITATION: Primary | ICD-10-CM

## 2022-01-01 DIAGNOSIS — R79.9 ABNORMAL FINDING OF BLOOD CHEMISTRY, UNSPECIFIED: ICD-10-CM

## 2022-01-01 DIAGNOSIS — I34.0 NONRHEUMATIC MITRAL VALVE REGURGITATION: ICD-10-CM

## 2022-01-01 DIAGNOSIS — C49.9 SARCOMA (HCC): ICD-10-CM

## 2022-01-01 DIAGNOSIS — I10 ESSENTIAL HYPERTENSION: Primary | ICD-10-CM

## 2022-01-01 DIAGNOSIS — F41.8 ANXIETY ABOUT DYING: ICD-10-CM

## 2022-01-01 DIAGNOSIS — R06.09 DYSPNEA ON EXERTION: ICD-10-CM

## 2022-01-01 DIAGNOSIS — Z51.5 HOSPICE CARE: ICD-10-CM

## 2022-01-01 DIAGNOSIS — C49.9 LEIOMYOSARCOMA (HCC): Primary | ICD-10-CM

## 2022-01-01 DIAGNOSIS — I35.0 CALCIFIC AORTIC VALVE STENOSIS: ICD-10-CM

## 2022-01-01 DIAGNOSIS — C49.6 MALIGNANT NEOPLASM OF SOFT TISSUE OF BACK (HCC): ICD-10-CM

## 2022-01-01 DIAGNOSIS — C49.9 LEIOMYOSARCOMA (HCC): ICD-10-CM

## 2022-01-01 LAB
ALBUMIN SERPL-MCNC: 3.3 G/DL (ref 3.5–5)
ALBUMIN/GLOB SERPL: 0.9 {RATIO} (ref 1.1–2.2)
ALP SERPL-CCNC: 154 U/L (ref 45–117)
ALT SERPL-CCNC: 20 U/L (ref 12–78)
ANION GAP SERPL CALC-SCNC: 2 MMOL/L (ref 5–15)
AST SERPL-CCNC: 21 U/L (ref 15–37)
BASOPHILS # BLD: 0.1 K/UL (ref 0–0.1)
BASOPHILS NFR BLD: 1 % (ref 0–1)
BILIRUB SERPL-MCNC: 0.5 MG/DL (ref 0.2–1)
BUN SERPL-MCNC: 19 MG/DL (ref 6–20)
BUN/CREAT SERPL: 17 (ref 12–20)
CALCIUM SERPL-MCNC: 9.5 MG/DL (ref 8.5–10.1)
CHLORIDE SERPL-SCNC: 102 MMOL/L (ref 97–108)
CO2 SERPL-SCNC: 29 MMOL/L (ref 21–32)
CREAT SERPL-MCNC: 1.11 MG/DL (ref 0.7–1.3)
DIFFERENTIAL METHOD BLD: ABNORMAL
EOSINOPHIL # BLD: 0.2 K/UL (ref 0–0.4)
EOSINOPHIL NFR BLD: 2 % (ref 0–7)
ERYTHROCYTE [DISTWIDTH] IN BLOOD BY AUTOMATED COUNT: 15.9 % (ref 11.5–14.5)
EST. AVERAGE GLUCOSE BLD GHB EST-MCNC: 120 MG/DL
GLOBULIN SER CALC-MCNC: 3.6 G/DL (ref 2–4)
GLUCOSE SERPL-MCNC: 95 MG/DL (ref 65–100)
HBA1C MFR BLD: 5.8 % (ref 4–5.6)
HCT VFR BLD AUTO: 37.6 % (ref 36.6–50.3)
HGB BLD-MCNC: 11.5 G/DL (ref 12.1–17)
IMM GRANULOCYTES # BLD AUTO: 0 K/UL (ref 0–0.04)
IMM GRANULOCYTES NFR BLD AUTO: 0 % (ref 0–0.5)
LYMPHOCYTES # BLD: 1 K/UL (ref 0.8–3.5)
LYMPHOCYTES NFR BLD: 11 % (ref 12–49)
MCH RBC QN AUTO: 27.9 PG (ref 26–34)
MCHC RBC AUTO-ENTMCNC: 30.6 G/DL (ref 30–36.5)
MCV RBC AUTO: 91.3 FL (ref 80–99)
MONOCYTES # BLD: 0.8 K/UL (ref 0–1)
MONOCYTES NFR BLD: 9 % (ref 5–13)
NEUTS SEG # BLD: 6.6 K/UL (ref 1.8–8)
NEUTS SEG NFR BLD: 77 % (ref 32–75)
NRBC # BLD: 0 K/UL (ref 0–0.01)
NRBC BLD-RTO: 0 PER 100 WBC
PLATELET # BLD AUTO: 412 K/UL (ref 150–400)
PMV BLD AUTO: 10.6 FL (ref 8.9–12.9)
POTASSIUM SERPL-SCNC: 4.6 MMOL/L (ref 3.5–5.1)
PROT SERPL-MCNC: 6.9 G/DL (ref 6.4–8.2)
RBC # BLD AUTO: 4.12 M/UL (ref 4.1–5.7)
SODIUM SERPL-SCNC: 133 MMOL/L (ref 136–145)
WBC # BLD AUTO: 8.6 K/UL (ref 4.1–11.1)

## 2022-01-01 PROCEDURE — 0651 HSPC ROUTINE HOME CARE

## 2022-01-01 PROCEDURE — G0299 HHS/HOSPICE OF RN EA 15 MIN: HCPCS

## 2022-01-01 PROCEDURE — 0655 HSPC INPATIENT RESPITE

## 2022-01-01 PROCEDURE — 74011250637 HC RX REV CODE- 250/637: Performed by: FAMILY MEDICINE

## 2022-01-01 PROCEDURE — 1101F PT FALLS ASSESS-DOCD LE1/YR: CPT | Performed by: INTERNAL MEDICINE

## 2022-01-01 PROCEDURE — G0156 HHCP-SVS OF AIDE,EA 15 MIN: HCPCS

## 2022-01-01 PROCEDURE — HOSPICE MEDICATION HC HH HOSPICE MEDICATION

## 2022-01-01 PROCEDURE — 3336590001 HSPC ROOM AND BOARD

## 2022-01-01 PROCEDURE — G0155 HHCP-SVS OF CSW,EA 15 MIN: HCPCS

## 2022-01-01 PROCEDURE — 3331090004 HSPC SERVICE INTENSITY ADD-ON

## 2022-01-01 PROCEDURE — 74011636637 HC RX REV CODE- 636/637: Performed by: FAMILY MEDICINE

## 2022-01-01 PROCEDURE — G8753 SYS BP > OR = 140: HCPCS | Performed by: FAMILY MEDICINE

## 2022-01-01 PROCEDURE — 99233 SBSQ HOSP IP/OBS HIGH 50: CPT | Performed by: FAMILY MEDICINE

## 2022-01-01 PROCEDURE — 74011250636 HC RX REV CODE- 250/636: Performed by: NURSE PRACTITIONER

## 2022-01-01 PROCEDURE — 74011000250 HC RX REV CODE- 250: Performed by: NURSE PRACTITIONER

## 2022-01-01 PROCEDURE — G8420 CALC BMI NORM PARAMETERS: HCPCS | Performed by: FAMILY MEDICINE

## 2022-01-01 PROCEDURE — 74011250636 HC RX REV CODE- 250/636: Performed by: FAMILY MEDICINE

## 2022-01-01 PROCEDURE — 71250 CT THORAX DX C-: CPT

## 2022-01-01 PROCEDURE — 1101F PT FALLS ASSESS-DOCD LE1/YR: CPT | Performed by: FAMILY MEDICINE

## 2022-01-01 PROCEDURE — G0463 HOSPITAL OUTPT CLINIC VISIT: HCPCS | Performed by: FAMILY MEDICINE

## 2022-01-01 PROCEDURE — G8536 NO DOC ELDER MAL SCRN: HCPCS | Performed by: INTERNAL MEDICINE

## 2022-01-01 PROCEDURE — 99213 OFFICE O/P EST LOW 20 MIN: CPT | Performed by: FAMILY MEDICINE

## 2022-01-01 PROCEDURE — G8510 SCR DEP NEG, NO PLAN REQD: HCPCS | Performed by: FAMILY MEDICINE

## 2022-01-01 PROCEDURE — 99205 OFFICE O/P NEW HI 60 MIN: CPT | Performed by: INTERNAL MEDICINE

## 2022-01-01 PROCEDURE — G8427 DOCREV CUR MEDS BY ELIG CLIN: HCPCS | Performed by: INTERNAL MEDICINE

## 2022-01-01 PROCEDURE — G8427 DOCREV CUR MEDS BY ELIG CLIN: HCPCS | Performed by: FAMILY MEDICINE

## 2022-01-01 PROCEDURE — G8752 SYS BP LESS 140: HCPCS | Performed by: INTERNAL MEDICINE

## 2022-01-01 PROCEDURE — G8420 CALC BMI NORM PARAMETERS: HCPCS | Performed by: INTERNAL MEDICINE

## 2022-01-01 PROCEDURE — 3336500001 HSPC ELECTION

## 2022-01-01 PROCEDURE — G8510 SCR DEP NEG, NO PLAN REQD: HCPCS | Performed by: INTERNAL MEDICINE

## 2022-01-01 PROCEDURE — 0656 HSPC GENERAL INPATIENT

## 2022-01-01 PROCEDURE — G8754 DIAS BP LESS 90: HCPCS | Performed by: FAMILY MEDICINE

## 2022-01-01 PROCEDURE — G8536 NO DOC ELDER MAL SCRN: HCPCS | Performed by: FAMILY MEDICINE

## 2022-01-01 PROCEDURE — G8754 DIAS BP LESS 90: HCPCS | Performed by: INTERNAL MEDICINE

## 2022-01-01 RX ORDER — TAMSULOSIN HYDROCHLORIDE 0.4 MG/1
CAPSULE ORAL
Qty: 90 CAPSULE | Refills: 3 | Status: SHIPPED | OUTPATIENT
Start: 2022-01-01

## 2022-01-01 RX ORDER — HALOPERIDOL 2 MG/ML
1 SOLUTION ORAL
Status: DISCONTINUED | OUTPATIENT
Start: 2022-01-01 | End: 2022-01-01 | Stop reason: HOSPADM

## 2022-01-01 RX ORDER — GLYCOPYRROLATE 0.2 MG/ML
0.2 INJECTION INTRAMUSCULAR; INTRAVENOUS EVERY 4 HOURS
Status: DISCONTINUED | OUTPATIENT
Start: 2022-01-01 | End: 2022-01-01

## 2022-01-01 RX ORDER — LORAZEPAM 1 MG/1
10 TABLET ORAL
Status: DISCONTINUED | OUTPATIENT
Start: 2022-01-01 | End: 2022-01-01

## 2022-01-01 RX ORDER — DIAZEPAM 5 MG/1
10 TABLET ORAL
Status: DISCONTINUED | OUTPATIENT
Start: 2022-01-01 | End: 2022-01-01 | Stop reason: HOSPADM

## 2022-01-01 RX ORDER — GLYCOPYRROLATE 0.2 MG/ML
0.2 INJECTION INTRAMUSCULAR; INTRAVENOUS EVERY 4 HOURS
Status: DISCONTINUED | OUTPATIENT
Start: 2022-01-01 | End: 2022-01-01 | Stop reason: HOSPADM

## 2022-01-01 RX ORDER — PREDNISONE 10 MG/1
10 TABLET ORAL
Status: DISCONTINUED | OUTPATIENT
Start: 2022-01-01 | End: 2022-01-01

## 2022-01-01 RX ORDER — OXYCODONE HCL 5 MG/5 ML
30 SOLUTION, ORAL ORAL
Status: DISCONTINUED | OUTPATIENT
Start: 2022-01-01 | End: 2022-01-01

## 2022-01-01 RX ORDER — OXYCODONE HCL 5 MG/5 ML
30 SOLUTION, ORAL ORAL
Status: DISCONTINUED | OUTPATIENT
Start: 2022-01-01 | End: 2022-01-01 | Stop reason: SDUPTHER

## 2022-01-01 RX ORDER — FACIAL-BODY WIPES
10 EACH TOPICAL DAILY PRN
Status: CANCELLED | OUTPATIENT
Start: 2022-01-01

## 2022-01-01 RX ORDER — OXYCODONE HCL 5 MG/5 ML
30 SOLUTION, ORAL ORAL 4 TIMES DAILY
Status: DISCONTINUED | OUTPATIENT
Start: 2022-01-01 | End: 2022-01-01

## 2022-01-01 RX ORDER — ACETAMINOPHEN 500 MG
1000 TABLET ORAL
Status: DISCONTINUED | OUTPATIENT
Start: 2022-01-01 | End: 2022-01-01 | Stop reason: HOSPADM

## 2022-01-01 RX ORDER — PROCHLORPERAZINE MALEATE 5 MG
10 TABLET ORAL
Status: DISCONTINUED | OUTPATIENT
Start: 2022-01-01 | End: 2022-01-01 | Stop reason: HOSPADM

## 2022-01-01 RX ORDER — PREDNISONE 10 MG/1
TABLET ORAL
Qty: 42 TABLET | Refills: 0 | Status: SHIPPED | OUTPATIENT
Start: 2022-01-01

## 2022-01-01 RX ORDER — DIAZEPAM 5 MG/1
10 TABLET ORAL EVERY 8 HOURS
Status: DISCONTINUED | OUTPATIENT
Start: 2022-01-01 | End: 2022-01-01

## 2022-01-01 RX ORDER — OXYCODONE HCL 20 MG/ML
30 CONCENTRATE, ORAL ORAL
Status: DISCONTINUED | OUTPATIENT
Start: 2022-01-01 | End: 2022-01-01

## 2022-01-01 RX ORDER — PHENOBARBITAL SODIUM 65 MG/ML
65 INJECTION INTRAMUSCULAR EVERY 6 HOURS
Status: DISCONTINUED | OUTPATIENT
Start: 2022-01-01 | End: 2022-01-01

## 2022-01-01 RX ORDER — TAMSULOSIN HYDROCHLORIDE 0.4 MG/1
0.4 CAPSULE ORAL DAILY
Status: DISCONTINUED | OUTPATIENT
Start: 2022-01-01 | End: 2022-01-01

## 2022-01-01 RX ORDER — CELECOXIB 200 MG/1
CAPSULE ORAL
COMMUNITY
Start: 2022-01-01 | End: 2022-01-01 | Stop reason: ALTCHOICE

## 2022-01-01 RX ORDER — AMOXICILLIN 250 MG
2 CAPSULE ORAL DAILY
Status: DISCONTINUED | OUTPATIENT
Start: 2022-01-01 | End: 2022-01-01

## 2022-01-01 RX ORDER — HYDROMORPHONE HYDROCHLORIDE 5 MG/5ML
8 SOLUTION ORAL EVERY 4 HOURS
Status: DISCONTINUED | OUTPATIENT
Start: 2022-01-01 | End: 2022-01-01

## 2022-01-01 RX ORDER — HYDROMORPHONE HYDROCHLORIDE 2 MG/ML
2 INJECTION, SOLUTION INTRAMUSCULAR; INTRAVENOUS; SUBCUTANEOUS
Status: DISCONTINUED | OUTPATIENT
Start: 2022-01-01 | End: 2022-01-01 | Stop reason: HOSPADM

## 2022-01-01 RX ORDER — DIAZEPAM 5 MG/1
10 TABLET ORAL
Status: DISCONTINUED | OUTPATIENT
Start: 2022-01-01 | End: 2022-01-01

## 2022-01-01 RX ORDER — CLINDAMYCIN HYDROCHLORIDE 150 MG/1
300 CAPSULE ORAL EVERY 6 HOURS
Qty: 56 CAPSULE | Refills: 0 | Status: SHIPPED | OUTPATIENT
Start: 2022-01-01 | End: 2022-01-01

## 2022-01-01 RX ORDER — OXYCODONE HCL 5 MG/5 ML
15 SOLUTION, ORAL ORAL
Status: DISCONTINUED | OUTPATIENT
Start: 2022-01-01 | End: 2022-01-01

## 2022-01-01 RX ORDER — PHENOBARBITAL SODIUM 65 MG/ML
65 INJECTION INTRAMUSCULAR EVERY 6 HOURS
Status: DISCONTINUED | OUTPATIENT
Start: 2022-01-01 | End: 2022-01-01 | Stop reason: HOSPADM

## 2022-01-01 RX ORDER — DIAZEPAM 10 MG/2ML
10 INJECTION INTRAMUSCULAR EVERY 8 HOURS
Status: DISCONTINUED | OUTPATIENT
Start: 2022-01-01 | End: 2022-01-01

## 2022-01-01 RX ORDER — TAMSULOSIN HYDROCHLORIDE 0.4 MG/1
0.4 CAPSULE ORAL
Status: DISCONTINUED | OUTPATIENT
Start: 2022-01-01 | End: 2022-01-01

## 2022-01-01 RX ORDER — MELOXICAM 15 MG/1
15 TABLET ORAL 2 TIMES DAILY
Qty: 180 TABLET | Refills: 3 | Status: SHIPPED | OUTPATIENT
Start: 2022-01-01 | End: 2022-01-01 | Stop reason: ALTCHOICE

## 2022-01-01 RX ORDER — PREDNISONE 10 MG/1
5 TABLET ORAL
Status: DISCONTINUED | OUTPATIENT
Start: 2022-01-01 | End: 2022-01-01

## 2022-01-01 RX ORDER — OXYCODONE HCL 20 MG/ML
30 CONCENTRATE, ORAL ORAL 4 TIMES DAILY
Status: DISCONTINUED | OUTPATIENT
Start: 2022-01-01 | End: 2022-01-01

## 2022-01-01 RX ORDER — MIDAZOLAM HYDROCHLORIDE 1 MG/ML
2 INJECTION, SOLUTION INTRAMUSCULAR; INTRAVENOUS
Status: DISCONTINUED | OUTPATIENT
Start: 2022-01-01 | End: 2022-01-01 | Stop reason: HOSPADM

## 2022-01-01 RX ORDER — KETOROLAC TROMETHAMINE 30 MG/ML
30 INJECTION, SOLUTION INTRAMUSCULAR; INTRAVENOUS
Status: DISCONTINUED | OUTPATIENT
Start: 2022-01-01 | End: 2022-01-01 | Stop reason: HOSPADM

## 2022-01-01 RX ORDER — HYOSCYAMINE SULFATE 0.12 MG/1
0.12 TABLET SUBLINGUAL
Status: DISCONTINUED | OUTPATIENT
Start: 2022-01-01 | End: 2022-01-01 | Stop reason: HOSPADM

## 2022-01-01 RX ORDER — FACIAL-BODY WIPES
10 EACH TOPICAL DAILY PRN
Status: DISCONTINUED | OUTPATIENT
Start: 2022-01-01 | End: 2022-01-01 | Stop reason: HOSPADM

## 2022-01-01 RX ORDER — ACETAMINOPHEN 650 MG/1
650 SUPPOSITORY RECTAL
Status: DISCONTINUED | OUTPATIENT
Start: 2022-01-01 | End: 2022-01-01 | Stop reason: HOSPADM

## 2022-01-01 RX ORDER — OXYCODONE HCL 5 MG/5 ML
20 SOLUTION, ORAL ORAL
Status: DISCONTINUED | OUTPATIENT
Start: 2022-01-01 | End: 2022-01-01

## 2022-01-01 RX ORDER — HYDROMORPHONE HYDROCHLORIDE 5 MG/5ML
8 SOLUTION ORAL
Status: DISCONTINUED | OUTPATIENT
Start: 2022-01-01 | End: 2022-01-01 | Stop reason: HOSPADM

## 2022-01-01 RX ORDER — AMOXICILLIN 250 MG
2 CAPSULE ORAL 2 TIMES DAILY
Status: DISCONTINUED | OUTPATIENT
Start: 2022-01-01 | End: 2022-01-01

## 2022-01-01 RX ORDER — DIAZEPAM 2 MG/1
10 TABLET ORAL
Status: DISCONTINUED | OUTPATIENT
Start: 2022-01-01 | End: 2022-01-01

## 2022-01-01 RX ORDER — OXYCODONE HYDROCHLORIDE 5 MG/1
5 CAPSULE ORAL
COMMUNITY
End: 2022-01-01 | Stop reason: ALTCHOICE

## 2022-01-01 RX ADMIN — HYDROMORPHONE HYDROCHLORIDE 8 MG: 5 SOLUTION ORAL at 04:26

## 2022-01-01 RX ADMIN — OXYCODONE HYDROCHLORIDE 15 MG: 5 SOLUTION ORAL at 22:57

## 2022-01-01 RX ADMIN — TAMSULOSIN HYDROCHLORIDE 0.4 MG: 0.4 CAPSULE ORAL at 20:26

## 2022-01-01 RX ADMIN — OXYCODONE HYDROCHLORIDE 30 MG: 100 SOLUTION ORAL at 12:06

## 2022-01-01 RX ADMIN — HYDROMORPHONE HYDROCHLORIDE 8 MG: 5 SOLUTION ORAL at 17:07

## 2022-01-01 RX ADMIN — TAMSULOSIN HYDROCHLORIDE 0.4 MG: 0.4 CAPSULE ORAL at 22:04

## 2022-01-01 RX ADMIN — SENNOSIDES AND DOCUSATE SODIUM 2 TABLET: 50; 8.6 TABLET ORAL at 17:41

## 2022-01-01 RX ADMIN — SENNOSIDES AND DOCUSATE SODIUM 2 TABLET: 50; 8.6 TABLET ORAL at 17:07

## 2022-01-01 RX ADMIN — OXYCODONE HYDROCHLORIDE 30 MG: 5 SOLUTION ORAL at 06:55

## 2022-01-01 RX ADMIN — SENNOSIDES AND DOCUSATE SODIUM 2 TABLET: 50; 8.6 TABLET ORAL at 08:12

## 2022-01-01 RX ADMIN — OXYCODONE HYDROCHLORIDE 30 MG: 100 SOLUTION ORAL at 20:01

## 2022-01-01 RX ADMIN — DIAZEPAM 10 MG: 2 TABLET ORAL at 00:58

## 2022-01-01 RX ADMIN — PHENOBARBITAL SODIUM 65 MG: 65 INJECTION INTRAMUSCULAR; INTRAVENOUS at 07:06

## 2022-01-01 RX ADMIN — LACTULOSE 60 ML: 20 SOLUTION ORAL at 11:58

## 2022-01-01 RX ADMIN — DIAZEPAM 10 MG: 5 TABLET ORAL at 05:13

## 2022-01-01 RX ADMIN — SENNOSIDES AND DOCUSATE SODIUM 2 TABLET: 50; 8.6 TABLET ORAL at 17:28

## 2022-01-01 RX ADMIN — OXYCODONE HYDROCHLORIDE 30 MG: 100 SOLUTION ORAL at 08:22

## 2022-01-01 RX ADMIN — OXYCODONE HYDROCHLORIDE 30 MG: 100 SOLUTION ORAL at 08:07

## 2022-01-01 RX ADMIN — HYDROMORPHONE HYDROCHLORIDE 8 MG: 5 SOLUTION ORAL at 16:28

## 2022-01-01 RX ADMIN — OXYCODONE HYDROCHLORIDE 30 MG: 5 SOLUTION ORAL at 21:17

## 2022-01-01 RX ADMIN — OXYCODONE HYDROCHLORIDE 30 MG: 100 SOLUTION ORAL at 04:12

## 2022-01-01 RX ADMIN — LACTULOSE 60 ML: 20 SOLUTION ORAL at 17:04

## 2022-01-01 RX ADMIN — OXYCODONE HYDROCHLORIDE 30 MG: 5 SOLUTION ORAL at 12:45

## 2022-01-01 RX ADMIN — OXYCODONE HYDROCHLORIDE 20 MG: 5 SOLUTION ORAL at 09:02

## 2022-01-01 RX ADMIN — HALOPERIDOL 1 MG: 2 SOLUTION ORAL at 00:58

## 2022-01-01 RX ADMIN — OXYCODONE HYDROCHLORIDE 30 MG: 100 SOLUTION ORAL at 11:36

## 2022-01-01 RX ADMIN — OXYCODONE HYDROCHLORIDE 30 MG: 100 SOLUTION ORAL at 17:59

## 2022-01-01 RX ADMIN — TAMSULOSIN HYDROCHLORIDE 0.4 MG: 0.4 CAPSULE ORAL at 21:00

## 2022-01-01 RX ADMIN — PREDNISONE 10 MG: 10 TABLET ORAL at 08:22

## 2022-01-01 RX ADMIN — OXYCODONE HYDROCHLORIDE 15 MG: 5 SOLUTION ORAL at 18:24

## 2022-01-01 RX ADMIN — DIAZEPAM 10 MG: 2 TABLET ORAL at 08:26

## 2022-01-01 RX ADMIN — PREDNISONE 10 MG: 10 TABLET ORAL at 09:01

## 2022-01-01 RX ADMIN — DIAZEPAM 10 MG: 5 TABLET ORAL at 16:28

## 2022-01-01 RX ADMIN — HYDROMORPHONE HYDROCHLORIDE 8 MG: 5 SOLUTION ORAL at 11:51

## 2022-01-01 RX ADMIN — HYDROMORPHONE HYDROCHLORIDE 8 MG: 5 SOLUTION ORAL at 23:03

## 2022-01-01 RX ADMIN — OXYCODONE HYDROCHLORIDE 5 MG: 5 SOLUTION ORAL at 23:36

## 2022-01-01 RX ADMIN — HYDROMORPHONE HYDROCHLORIDE 8 MG: 5 SOLUTION ORAL at 05:14

## 2022-01-01 RX ADMIN — HYDROMORPHONE HYDROCHLORIDE 8 MG: 5 SOLUTION ORAL at 00:11

## 2022-01-01 RX ADMIN — OXYCODONE HYDROCHLORIDE 20 MG: 5 SOLUTION ORAL at 06:04

## 2022-01-01 RX ADMIN — DIAZEPAM 10 MG: 2 TABLET ORAL at 20:06

## 2022-01-01 RX ADMIN — OXYCODONE HYDROCHLORIDE 30 MG: 100 SOLUTION ORAL at 22:56

## 2022-01-01 RX ADMIN — OXYCODONE HYDROCHLORIDE 30 MG: 100 SOLUTION ORAL at 22:49

## 2022-01-01 RX ADMIN — HYDROMORPHONE HYDROCHLORIDE 2 MG: 2 INJECTION INTRAMUSCULAR; INTRAVENOUS; SUBCUTANEOUS at 06:31

## 2022-01-01 RX ADMIN — PREDNISONE 10 MG: 10 TABLET ORAL at 08:12

## 2022-01-01 RX ADMIN — DIAZEPAM 10 MG: 2 TABLET ORAL at 11:54

## 2022-01-01 RX ADMIN — DIAZEPAM 10 MG: 5 TABLET ORAL at 22:16

## 2022-01-01 RX ADMIN — HYOSCYAMINE SULFATE 0.12 MG: 0.12 TABLET ORAL; SUBLINGUAL at 04:26

## 2022-01-01 RX ADMIN — PREDNISONE 10 MG: 10 TABLET ORAL at 08:40

## 2022-01-01 RX ADMIN — HYDROMORPHONE HYDROCHLORIDE 8 MG: 5 SOLUTION ORAL at 20:14

## 2022-01-01 RX ADMIN — OXYCODONE HYDROCHLORIDE 30 MG: 5 SOLUTION ORAL at 17:06

## 2022-01-01 RX ADMIN — MIDAZOLAM 2 MG: 1 INJECTION INTRAMUSCULAR; INTRAVENOUS at 07:06

## 2022-01-01 RX ADMIN — HYOSCYAMINE SULFATE 0.12 MG: 0.12 TABLET ORAL; SUBLINGUAL at 22:23

## 2022-01-01 RX ADMIN — MIDAZOLAM 2 MG: 1 INJECTION INTRAMUSCULAR; INTRAVENOUS at 08:21

## 2022-01-01 RX ADMIN — SENNOSIDES AND DOCUSATE SODIUM 2 TABLET: 50; 8.6 TABLET ORAL at 08:22

## 2022-01-01 RX ADMIN — TAMSULOSIN HYDROCHLORIDE 0.4 MG: 0.4 CAPSULE ORAL at 22:55

## 2022-01-01 RX ADMIN — DIAZEPAM 10 MG: 5 TABLET ORAL at 20:16

## 2022-01-01 RX ADMIN — TAMSULOSIN HYDROCHLORIDE 0.4 MG: 0.4 CAPSULE ORAL at 23:03

## 2022-01-01 RX ADMIN — OXYCODONE HYDROCHLORIDE 20 MG: 5 SOLUTION ORAL at 02:32

## 2022-01-01 RX ADMIN — GLYCOPYRROLATE 0.2 MG: 0.2 INJECTION, SOLUTION INTRAMUSCULAR; INTRAVENOUS at 06:31

## 2022-01-01 RX ADMIN — HYDROMORPHONE HYDROCHLORIDE 8 MG: 5 SOLUTION ORAL at 19:55

## 2022-01-01 RX ADMIN — HYDROMORPHONE HYDROCHLORIDE 2 MG: 2 INJECTION INTRAMUSCULAR; INTRAVENOUS; SUBCUTANEOUS at 08:21

## 2022-01-01 RX ADMIN — OXYCODONE HYDROCHLORIDE 30 MG: 100 SOLUTION ORAL at 17:41

## 2022-02-22 NOTE — PROGRESS NOTES
1. Have you been to the ER, urgent care clinic since your last visit? Hospitalized since your last visit? No    2. Have you seen or consulted any other health care providers outside of the [address] [place] [address] since your last visit? Include any pap smears or colon screening. Yes, [facility]. 02/2022    Health Maintenance Due   Topic Date Due    Low dose CT lung screening  Never done     Chief Complaint   Patient presents with    Hypertension     6 month f/u    Medication Evaluation     Pt wanting a Rx for Clindamycin 150 mg.

## 2022-02-22 NOTE — PROGRESS NOTES
Chief Complaint   Patient presents with    Hypertension     6 month f/u    Medication Evaluation     Current issues:    1. Pt has noted significant changes in short term memory  2. Increased issues with depth perception, struggles with placement or hands and other objects. 3. Trouble with feeling wobbly with sudden stops or turns  4. No recent vertigo issues, used to struggle with  5. Lisinopril has been reduced, about 5 mg twice a week, due to aortic stenosis keeps systolic up, feels better, normally 140s. Aortic stenosis had suddenly developed over past year. 6. GI issues - good appetite, snacks constantly. Weight has not changed, but has noticed a significant decrease in muscle. Pt does report decreased activity. 7. Celebrex has helped with aches related to sarcoma. Pt then had an episode of increased pain, oncologist gave him pain medications. Pt reports that has not needed any since January 6th. Pt reports that percocet worked better than plain oxycodone. Pt would like to change back to meloxicam when a refill is needed due to cost of celebrex. Subjective: (As above and below)     Chief Complaint   Patient presents with    Hypertension     6 month f/u    Medication Evaluation     he is a [de-identified]y.o. year old male who presents for evaluation. Reviewed PmHx, RxHx, FmHx, SocHx, AllgHx and updated in chart.     Review of Systems - negative except as listed above    Objective:     Vitals:    02/22/22 1313 02/22/22 1328   BP: (!) 147/68 (!) 146/64   Pulse: 64    Resp: 16    Temp: 98 °F (36.7 °C)    TempSrc: Oral    SpO2: 97%    Weight: 140 lb 3.2 oz (63.6 kg)    Height: 5' 4\" (1.626 m)      Physical Examination: General appearance - alert, well appearing, and in no distress  Mental status - normal mood, behavior, speech, dress, motor activity, and thought processes  Chest - clear to auscultation, no wheezes, rales or rhonchi, symmetric air entry  Heart - systolic murmur 4/6 throughout the precordium  Musculoskeletal - no joint tenderness, deformity or swelling  Prominent mass right mid back    Assessment/ Plan:   1. Essential hypertension  -check labs  - METABOLIC PANEL, COMPREHENSIVE; Future  - CBC WITH AUTOMATED DIFF; Future  - HEMOGLOBIN A1C WITH EAG; Future    2. Calcific aortic valve stenosis  -followed by cardiology    3. Sarcoma ([facility] [address]  -followed by [address] [place] [address] oncology    4. Abnormal finding of blood chemistry, unspecified   - HEMOGLOBIN A1C WITH EAG; Future       I have discussed the diagnosis with the patient and the intended plan as seen in the above orders. The patient has received an after-visit summary and questions were answered concerning future plans.      Medication Side Effects and Warnings were discussed with patient: yes  Patient Labs were reviewed: yes  Patient Past Records were reviewed:  yes    Jeremy Andrews M.D.

## 2022-03-14 NOTE — TELEPHONE ENCOUNTER
----- Message from Too Simpson sent at 3/14/2022  9:37 AM EDT -----  Regarding: Meloxicam Rx  DrR, could you send in a prescription to [facility] for Meloxicam 15mg 1 PO BID #180 (renewable X3). I have had no tumor related pain since +/-6 Trent.. Kamlesh Ames I've taken no oxycodone for over 2 months. The Celebrex has been unnecessary for the tumor & too damn expensive. If [facility] gives you any problems just send a paper prescription to me & I'll pay for it myself. Feeling better than expected with good appetite, mood etc. I wish that I had more stamina but I'm 81!   Alyssa Malcolm

## 2022-03-17 NOTE — TELEPHONE ENCOUNTER
[facility] ([facility]) wants your approval to fill the Meloxicam Rx as is, because concerns of high dose.     Phone #  [phone]  Ref #  [id number]

## 2022-06-16 NOTE — TELEPHONE ENCOUNTER
[facility] Palliative Medicine Office  Nursing Note  [phone])  Fax [phone]      Name:  Devin Rubio  YOB: 1940    Incoming call from [address] at [facility] [place]. She states that Thomas Curry MD (Rad/Onc) would like to refer Devin Rubio to outpatient Palliative Medicine for symptom management and supportive care. Notes in Care Everywhere reviewed. 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 Patient's most recent office visit with Dr. Teresa Martinez was on 6/16/22. Patient was seen by Dr. Lamberto Schaefer on 9/30/21- he recommended chemo radiotherapy. Patient saw Dr. Maeve Munguia at [facility] for a second opinion - Dr. Fredo Jeronimo discussed pre-operative radiation followed by surgical resection. Patient has history of mitral valve regurgitation and aortic valve stenosis and given this cardiac history, patient did not want to undergo surgery. Patient was then referred to Radiation Oncology. ACP:     [facility] AMD signed on 7/27/2017 is on file. Primary Healthcare Decision Maker is wife Jessica Ochoa, Secondary Healthcare Decision Makers are children  Ezekiel Patel and Katey Servin, either of whom may act. DDNR signed on 6/10/19 is on file. This nurse explained to Ms. Renee Corderodock that Dr. Silverio Rubio prefers that her nurse schedule the appointments and she will be giving patient a call  soon for scheduling. Ms. Renee Adams says the best number to reach patient is his home number [phone]. She thinks that he no longer has his cell phone. Ms. Renee Adams states that if we have trouble reaching patient to please call her at [phone].     DANIA SmithN, RN-BC, [facility]

## 2022-07-01 NOTE — PROGRESS NOTES
Palliative Medicine Office Visit  Palliative Medicine Nurse Check In  [phone]    Patient Name: Gisele Durham  YOB: 1940      Date of Office Visit: 7/1/2022  Patient states: \"  \"    From Check In Sheet (scanned in Media):  Has a medical provider talked with you about cardiopulmonary resuscitation (CPR)? [x] Yes   [] No   [] Unable to obtain    Nurse reminder to complete or update ACP FlowSheet:    Is ACP on the Problem List?    [x] Yes    [] No  IF ACP Document is ON FILE; Nurse to place ACP on Problem List     Is there an ACP Note in Chart Review/Note? [x] Yes    [] No   If NO: ALERT PROVIDER       Primary Decision Maker: Lola Jo - [facility] - [phone]  Advance Care Planning 7/1/2022   Patient's Healthcare Decision Maker is: Named in scanned ACP document   Confirm Advance Directive Yes, on file   Does the patient have other document types Do Not Resuscitate         Is there anything that we should know about you as a person in order to provide you the best care possible? Have you been to the ER, urgent care clinic since your last visit? [] Yes   [x] No   [] Unable to obtain    Have you been hospitalized since your last visit? [] Yes   [x] No   [] Unable to obtain    Have you seen or consulted any other health care providers outside of the [address] [place] [address] since your last visit?    [] Yes   [x] No   [] Unable to obtain    Functional status (describe):         Last BM: 6/24/2022   accessed (date): 7/1/2022    Bottle review (for opioid pain medication):  Medication 1:   Date filled:   Directions:   # filled:   # left:   # pills taking per day:  Last dose taken:    Medication 2:   Date filled:   Directions:   # filled:   # left:   # pills taking per day:  Last dose taken:    Medication 3:   Date filled:   Directions:   # filled:   # left:   # pills taking per day:  Last dose taken:    Medication 4:   Date filled:   Directions:   # filled:   # left:   # pills taking per day:  Last dose taken:

## 2022-07-01 NOTE — PROGRESS NOTES
Palliative Medicine Outpatient Services  [place]: [phone] ([phone])    Patient Name: Armida Nyhan  YOB: 1940    Date of Current Visit: 07/01/22  Location of Current Visit:    [] [facility] Office  [] [facility] Office  [x] [facility] Office  [] Home  []Synchronous real-time A/V virtual visit    Date of Initial Visit: 7/1/2022  Referral from: Dr. Rusty Quinteros  Primary Care Physician: Risser, Lestine Dance, MD      SUMMARY:   Armida Nyhan is a 81y.o. 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 Rusty Quinteros for management of symptoms and end-stage disease. 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 Palliative Medicine is addressing the following current patient/family concerns: Worsening shortness of breath, precipitous decline in functional status, unable to walk more than 10 feet, not hypoxic but labored breathing and tachycardia, little to no appetite, constipation    Initial Referral Intake note reviewed   PALLIATIVE DIAGNOSES:       ICD-10-CM ICD-9-CM    1. 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 Dyspnea on exertion  R06.00 786.09 predniSONE (DELTASONE) 10 mg tablet   3. Leiomyosarcoma (HCC)  C49.9 171.9 DO NOT RESUSCITATE   4.  Right-sided chest wall pain  R07.89 786.52 5. Nonrheumatic mitral valve regurgitation  I34.0 424.0           PLAN:   Patient Instructions     Dear Katrin Hein ,    It was a pleasure seeing you today in [facility] clinic along with your wife      If labs or imaging tests have been ordered for you today, please call the office  at [phone] 48 hours after completion to obtain the results. Your stated goal:   To live well as long as he can    Your described symptoms were: Fatigue: 9 Drowsiness: 4   Depression: 0 Pain: 3   Anxiety: 3 Nausea: 0   Anorexia: 10 Dyspnea: 10   Best Well-Bein Constipation: Yes   Other Problem (Comment): 0       This is the plan we talked about:    1. Shortness of breath, right-sided chest wall pain from effusion and atelectasis, mediastinal pain  -You are struggling with shortness of breath and right-sided discomfort which only relieves while laying on the right side. You not a candidate for thoracentesis given the low amount of fluid seen in imaging. You may benefit from it in the future should you have an acute decline. In the meantime, starting you on steroids might help us with the inflammatory pain, shortness of breath and improving appetite.  -Start prednisone 40 mg for 1 week, decrease to 30 mg for the second week followed by 20 mg for the third week and then 10 mg for the fourth week. We agreed to try this regimen to help boost her appetite and give you some energy.  -Please take Protonix 40 mg daily while you are on prednisone to prevent gastritis. 2.  Right-sided pain  -You are currently on oxycodone/acetaminophen 10/325 mg every 4 hours with good benefit. But there are \"splints \"of pain on your left mid back, lower back, etc. where you have tumors. You may benefit from liquid oxycodone in addition to the Percocet you are taking to help with this pain. Hospice will help with the dosing of that medication.     3.  Poor appetite  -You have little to no appetite, eat almost nothing and drink very little. We talked about the importance of eating and drinking what ever you desire. We talked about medications to help you with appetite but you do not want to try anything right now. I believe the steroids will help you with this. 4.  Goals of care  -You are very clear about not wanting any systemic therapy for the sarcoma, you want comfort oriented care. You already have a DO NOT RESUSCITATE. We talked about the benefits of initiating hospice at this time of your care in detail and you and your wife agree. You think you only have days and few weeks left to live and would like to pass away in a hospice facility and not a new home in order to protect your wife. I believe engaging with the hospice team now can help plan for that.  -Hospice referral placed. This is what you have shared with us about Advance Care Planning:      Primary Decision Maker: Uzma Caicedo - Spouse - [phone]  Advance Care Planning 7/1/2022   Patient's Healthcare Decision Maker is: Named in scanned ACP document   Confirm Advance Directive Yes, on file   Does the patient have other document types Do Not Resuscitate           The Palliative Medicine Team is here to support you and your family.        Sincerely,      Yara Santillan MD and the Palliative Medicine Team       GOALS OF CARE / TREATMENT PREFERENCES:   [====Goals of Care====]  GOALS OF CARE:  Patient / health care proxy stated goals: See Patient Instructions / Summary    TREATMENT PREFERENCES:   Code Status:  [] Attempt Resuscitation       [x] Do Not Attempt Resuscitation    Advance Care Planning:  [x] The [facility] Interdisciplinary Team has updated the ACP Navigator with Decision Maker and Patient Capacity      Primary Decision Maker: Uzma Caicedo - Spouse - [phone]  Advance Care Planning 7/1/2022   Patient's Healthcare Decision Maker is: Named in scanned ACP document   Confirm Advance Directive Yes, on file   Does the patient have other document types Do Not Resuscitate       Other:  (If patient appropriate for POST, consider using PALLPOST smart phrase here)    The palliative care team has discussed with patient / health care proxy about goals of care / treatment preferences for patient.  [====Goals of Care====]     PRESCRIPTIONS GIVEN:     Medications Ordered Today   Medications    predniSONE (DELTASONE) 10 mg tablet     Sig: Take 3 tabs daily x 7 days, then 2 tabs daily x 7 days, then 1 tab daily x 7 days     Dispense:  42 Tablet     Refill:  0           FOLLOW UP:   No future appointments. PHYSICIANS INVOLVED IN CARE:   Patient Care Team:  Flory Rico MD as PCP - General (Family Medicine)  [facility], Adam Rocha [facility]  Keenan Ramos MD as Surgeon (General Surgery)       HISTORY:   Reviewed patient-completed ESAS and advance care planning form. Reviewed patient record in prescription monitoring program.    CHIEF COMPLAINT: No chief complaint on file. HPI/SUBJECTIVE:    The patient is: [x] Verbal / [] Nonverbal   Pleasant gentleman, here with his wife. He provides excellent history and timeline of his diagnosis of various medical conditions since early 2000. He made a very logical decision not to proceed with surgical intervention of the sarcoma given his severe aortic stenosis and mitral regurgitation. He is clear about wanting quality of life. Currently, right-sided chest wall pain and shortness of breath are his most quality of life limiting symptoms. Shortness of breath is worsened over the last few weeks to the point that he has labored breathing even with a few steps. He is not hypoxic at rest but very tachycardic and maintains oxygenation in the low 90s at rest.  He has not checked his oxygen level on exertion. His ability to do activities of daily living has been decreasing slowly over the last few weeks and wife is having a hard time coping with this.   He has a son and daughter who live locally but both of them and their families are not vaccinated against COVID so they have not been visiting. They are not very involved in his care either. The couple relies on their long-term friends who live on the [facility] side but are hesitant to ask for help such as shaving Rowena Callejas, etc.  Both very open to hospice care. Clinical Pain Assessment (nonverbal scale for nonverbal patients):   [++++ Clinical Pain Assessment++++]  [++++Pain Severity++++]: Pain: 3  [++++Pain Character++++]: sharp, stabbing  [++++Pain Duration++++]: months  [++++Pain Effect++++]: functional  [++++Pain Factors++++]: none in particular  [++++Pain Frequency++++]: constant   [++++Pain Location++++]: right-sided chest wall, mid back and left lower back  [++++ Clinical Pain Assessment++++]       FUNCTIONAL ASSESSMENT:     Palliative Performance Scale (PPS):  PPS: 50     PSYCHOSOCIAL/SPIRITUAL SCREENING:     Any spiritual / Mandaen concerns:  [] Yes /  [x] No    Caregiver Burnout:  [] Yes /  [x] No /  [] No Caregiver Present      Anticipatory grief assessment:   [x] Normal  / [] Maladaptive       ESAS Anxiety: Anxiety: 3    ESAS Depression: Depression: 0       REVIEW OF SYSTEMS:     The following systems were [x] reviewed / [] unable to be reviewed  Systems: constitutional, ears/nose/mouth/throat, respiratory, gastrointestinal, genitourinary, musculoskeletal, integumentary, neurologic, psychiatric, endocrine. Positive findings noted below. Modified ESAS Completed by: provider   Fatigue: 9 Drowsiness: 4   Depression: 0 Pain: 3   Anxiety: 3 Nausea: 0   Anorexia: 10 Dyspnea: 10   Best Well-Bein Constipation: Yes   Other Problem (Comment): 0          PHYSICAL EXAM:     Wt Readings from Last 3 Encounters:   22 140 lb 3.2 oz (63.6 kg)   10/12/21 146 lb 3.2 oz (66.3 kg)   21 147 lb (66.7 kg)     Blood pressure 123/73, pulse (!) 137, temperature (!) 95.7 °F (35.4 °C), temperature source Temporal, resp.  rate 20, height 5' 4\" (1.626 m), SpO2 93 %.   Last bowel movement: See Nursing Note    Constitutional: Alert, oriented, cachectic, able to speak in full sentences  Eyes: pupils equal, anicteric  ENMT: no nasal discharge, moist mucous membranes  Cardiovascular: regular rhythm, distal pulses intact  Respiratory: breathing not labored, symmetric  Gastrointestinal: soft non-tender, +bowel sounds  Musculoskeletal: no deformity, no tenderness to palpation  Skin: warm, dry  Neurologic: following commands, moving all extremities  Psychiatric: full affect, no hallucinations  Other:       HISTORY:     Past Medical History:   Diagnosis Date    Anxiety     years ago    Arrhythmia     SVT    Arthritis     DJD knees    Cancer [facility]) August 2021    probable lieomyosarcoma    Chronic bronchitis ([address]     Dental disorder periodontitis    Elevated PSA     Enlarged prostate     GERD (gastroesophageal reflux disease)     Gingivitis     Hepatitis A antibody positive     Hepatitis B antibody positive     Hypercholesterolemia     Hypertension     Liver disease     Mitral chordae rupture ([address]     Other ill-defined conditions(799.89)     increased cholesterol    Other ill-defined conditions(799.89)     ruptured chordea tinea right mitral valve    Other ill-defined conditions(799.89)     left ventricular hypertrophy    Other ill-defined conditions(799.89)     hepatitis A and B exposure    Other ill-defined [id number](862.20)     skin lichen planus    Stroke ([facility] [address] 2014    stable    Tinnitus       Past Surgical History:   Procedure Laterality Date    HX CHOLECYSTECTOMY  2014    laparoscopic cholecystectomy    HX COLONOSCOPY  2014    polyps    HX HEENT      T & A    HX HERNIA REPAIR      HX KNEE ARTHROSCOPY  2002    bilateral    HX OTHER SURGICAL      right inguinal hernia repair    HX OTHER SURGICAL      colonoscopy - diverticulosis/no polyps    HX OTHER SURGICAL  1969    right forearm fragment wound    HX PROSTATE SURGERY      prostate Bx X2    HX UROLOGICAL      BPH    NEUROLOGICAL PROCEDURE UNLISTED      poor recent memory & increased carelessness    MD CARDIAC SURG PROCEDURE UNLIST  Dec 2020    significant aortic stenosis ? in need of TAVR? ?? Family History   Problem Relation Age of Onset    Lung Disease Mother         pneumococcal sepsis/pneumonia    Diabetes Mother     Kidney Disease Father     Heart Disease Father         aortic valve disease    Headache Maternal Uncle     Anesth Problems Neg Hx       History reviewed, no pertinent family history. Social History     Tobacco Use    Smoking status: Current Every Day Smoker     Packs/day: 1.00     Years: 62.00     Pack years: 62.00    Smokeless tobacco: Never Used    Tobacco comment: cigarettes   Substance Use Topics    Alcohol use: Yes     Alcohol/week: 1.0 standard drink     Types: 1 Glasses of wine per week     Allergies   Allergen Reactions    Cephalexin Rash    Cephalosporins Rash    Penicillins Rash      Current Outpatient Medications   Medication Sig    predniSONE (DELTASONE) 10 mg tablet Take 3 tabs daily x 7 days, then 2 tabs daily x 7 days, then 1 tab daily x 7 days    tamsulosin (FLOMAX) 0.4 mg capsule TAKE 1 CAPSULE NIGHTLY    lisinopriL (PRINIVIL, ZESTRIL) 10 mg tablet Take 5 mg by mouth daily.  atenoloL (TENORMIN) 50 mg tablet Take 1 Tablet by mouth daily.  omeprazole (PRILOSEC) 20 mg capsule Take 20 mg by mouth daily.  docusate sodium (COLACE) 100 mg capsule Take 250 mg by mouth two (2) times a day. No current facility-administered medications for this visit.           LAB and other DATA REVIEWED:     Lab Results   Component Value Date/Time    WBC 8.6 02/22/2022 02:13 PM    HGB 11.5 (L) 02/22/2022 02:13 PM    PLATELET 846 (H) 93/32/5445 02:13 PM     Lab Results   Component Value Date/Time    Sodium 133 (L) 02/22/2022 02:13 PM    Potassium 4.6 02/22/2022 02:13 PM    Chloride 102 02/22/2022 02:13 PM    CO2 29 02/22/2022 02:13 PM    BUN 19 02/22/2022 02:13 PM    Creatinine 1.11 02/22/2022 02:13 PM    Calcium 9.5 02/22/2022 02:13 PM      Lab Results   Component Value Date/Time    Alk. phosphatase 154 (H) 02/22/2022 02:13 PM    Protein, total 6.9 02/22/2022 02:13 PM    Albumin 3.3 (L) 02/22/2022 02:13 PM    Globulin 3.6 02/22/2022 02:13 PM     No results found for: INR, PTMR, PTP, PT1, PT2, APTT, INREXT, INREXT   No results found for: IRON, FE, TIBC, IBCT, PSAT, FERR     Detail chart reviewed. Other specialists and referral provider notes reviewed. CONTROLLED SUBSTANCES SAFETY ASSESSMENT (IF ON CONTROLLED SUBSTANCES):     Reviewed opioid safety handout:  [x] Yes   [] No  24 hour opioid dose >150mg morphine equivalent/day:  [] Yes   [] No  Benzodiazepines:  [] Yes   [] No  Sleep apnea:  [] Yes   [] No  Urine Toxicology Testing within last 6 months:  [] Yes   [] No  History of or new aberrant medication taking behaviors:  [] Yes   [x] No  Has Narcan been prescribed [x] Yes   [] No          Total time: 90 minutes   Counseling / coordination time: 90 minutes  > 50% counseling / coordination?:     Please note that this dictation was completed with [employer] the [employer] voice recognition software. Quite often unanticipated grammatical, syntax, homophones, and other interpretive errors are inadvertently transcribed by the computer software. Please disregard these errors.   Please excuse any errors that have escaped final proofreading

## 2022-07-01 NOTE — PATIENT INSTRUCTIONS
Dear Gardenia Anna ,    It was a pleasure seeing you today in [facility] clinic along with your wife      If labs or imaging tests have been ordered for you today, please call the office  at [phone] 48 hours after completion to obtain the results. Your stated goal:   To live well as long as he can    Your described symptoms were: Fatigue: 9 Drowsiness: 4   Depression: 0 Pain: 3   Anxiety: 3 Nausea: 0   Anorexia: 10 Dyspnea: 10   Best Well-Bein Constipation: Yes   Other Problem (Comment): 0       This is the plan we talked about:    1. Shortness of breath, right-sided chest wall pain from effusion and atelectasis, mediastinal pain  -You are struggling with shortness of breath and right-sided discomfort which only relieves while laying on the right side. You not a candidate for thoracentesis given the low amount of fluid seen in imaging. You may benefit from it in the future should you have an acute decline. In the meantime, starting you on steroids might help us with the inflammatory pain, shortness of breath and improving appetite.  -Start prednisone 40 mg for 1 week, decrease to 30 mg for the second week followed by 20 mg for the third week and then 10 mg for the fourth week. We agreed to try this regimen to help boost her appetite and give you some energy.  -Please take Protonix 40 mg daily while you are on prednisone to prevent gastritis. 2.  Right-sided pain  -You are currently on oxycodone/acetaminophen 10/325 mg every 4 hours with good benefit. But there are \"splints \"of pain on your left mid back, lower back, etc. where you have tumors. You may benefit from liquid oxycodone in addition to the Percocet you are taking to help with this pain. Hospice will help with the dosing of that medication. 3.  Poor appetite  -You have little to no appetite, eat almost nothing and drink very little. We talked about the importance of eating and drinking what ever you desire. We talked about medications to help you with appetite but you do not want to try anything right now. I believe the steroids will help you with this. 4.  Goals of care  -You are very clear about not wanting any systemic therapy for the sarcoma, you want comfort oriented care. You already have a DO NOT RESUSCITATE. We talked about the benefits of initiating hospice at this time of your care in detail and you and your wife agree. You think you only have days and few weeks left to live and would like to pass away in a hospice facility and not a new home in order to protect your wife. I believe engaging with the hospice team now can help plan for that.  -Hospice referral placed. This is what you have shared with us about Advance Care Planning:      Primary Decision Maker: Maria Victoria Irving - Spouse - [phone]  Advance Care Planning 7/1/2022   Patient's Healthcare Decision Maker is: Named in scanned ACP document   Confirm Advance Directive Yes, on file   Does the patient have other document types Do Not Resuscitate           The Palliative Medicine Team is here to support you and your family.        Sincerely,      Enid Levine MD and the Palliative Medicine Team

## 2022-07-03 NOTE — HOSPICE
Javi Rojas  1940  82     Principle Hospice Diagnosis: Metastatic Leiomyosarcoma  Diagnoses RELATED to the terminal prognosis:   Unrelated Diagnosis: Anxiety, Arthritis, Arrhythmia, Mitral chordae rapture, Chronic Bronchitis, GERD, Enlarged prostate, Hepatitis A&B, Hypertension, Hypercholesterolemia     Date of Hospice Admission: 2022  Hospice Attending Elected by Patient: Dr. Johnnie Root   Primary Care Physician:      Admitting RN: Marla Taveras RN  Nurse CM: Alexsander [employer] Worker: Sandy Lauren: Tony Doyle DNR: Yes     Service: Yes      Home: Discussed with MSW     Direct Observation:    Visit made along with [facility] for better communication of hospice services and different levels of care. Hospice philosophy, and goals of care discussed at length with patient, wife Kaylie Sport and family friends. Patient and wife have elected hospice services. On arrival patient laying on right side on sofa in no apparent distress, Alert and oriented x4. Patient reports he has been getting weaker and experiencing dyspnea with exertion. Patient also noted to have mild dyspnea while speaking. Patient also reports severe generalized pain 9/10 at its worst and currently 5/10, which is ok for him. While sitting up and signing consents patient had an episode of dizziness that resolved after laying down for a few minutes. Patient was tachycardic on assessment other vitals WNL. Patient reports Blood pressure is labile, and he is at times hypotensive and symptomatic. Appetite is very poor, appears cachectic and reports he just does not feel like eating or drinking. Medications and plan of care reviewed with patient and wife both voice understanding of information and agree with plan of care. Encouraged to call hospice with any questions or concerns. Oxygen concentrator ordered for delivery today.     Palliative Performance Scale: 50%    ER Visits/ Hospitalizations in past year: 0    Onset Date of Hospice Diagnosis: 6/2022   Summary of Disease Progression Leading to Hospice Diagnosis: Excerpt from Dr. Meyer Files note Ashlee Greer is a 81y.o. 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 Angela Rehman for management of symptoms and end-stage disease. 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 Palliative Medicine is addressing the following current patient/family concerns: Worsening shortness of breath, precipitous decline in functional status, unable to walk more than 10 feet, not hypoxic but labored breathing and tachycardia, little to no appetite, constipation     Use LCD Guidelines and list features:   ________  A. Disease with distant metastases at presentation OR    ____x____  B. Progression from an earlier stage of disease to metastatic disease with either:                          ________  1. continued decline in spite of therapy                          ____x____  2. patient declines further disease directed therapy          SPIRITUAL/Social/Emotional/psych:     Dr. Chestine Apley contacted, agrees to serve as attending provider for hospice and provided verbal certification of terminal illness with prognosis of 6 months or less life expectancy. Orders for hospice admission, medications and plan of treatment received.  Medication reconciliation completed.         Currently this patient has:  Supplemental O2: 2L PRN            MEDS:  I have reviewed the patient's medication list with MD and identified the following:  Nonformulary medications: Tamsulosin  Unrelated medications: n/a     IDT communication to include MD, , SW, [address] [place] [address] and support team.

## 2022-07-03 NOTE — HOSPICE
Ida Christianson  1940  82     Principle Hospice Diagnosis: Metastatic Leiomyosarcoma  Diagnoses RELATED to the terminal prognosis:   Unrelated Diagnosis: Anxiety, Arthritis, Arrhythmia, Mitral chordae rapture, Chronic Bronchitis, GERD, Enlarged prostate, Hepatitis A&B, Hypertension, Hypercholesterolemia     Date of Hospice Admission: 2022  Hospice Attending Elected by Patient: Dr. Miya Mariano   Primary Care Physician:      Admitting RN: Lizz Florentino, RN  Nurse CM: Deniz Singh Worker: Otilio Sanchezt: Sayra Thomas DNR: Yes     Service: Yes      Home: Discussed with MSW     Direct Observation:    Visit made along with Giulia Sanchez JAY for better communication of hospice services. Hospice philosophy, and goals of care discussed at length with patient, wife Sreedhar Stevenson and family friends. Patient and wife have elected hospice services. On arrival patient laying on right side on sofa in no apparent distress Alert and oriented x4. Patient reports he has been getting weaker and experiencing dyspnea with exertion. Patient also noted to have mild dyspnea while speaking. Patient also reports severe pain 9/10 all over at its worst and currently 5/10 which is ok for him. While sitting up and signing consents patient had an episode of dizziness that resolved after laying down for a few minutes. Patient was tachycardic on assessment other vitals WNL. Patient reports Blood pressure is labile, and he is at times hypotensive and symptomatic. Appetite is very poor, appears cachectic and reports he just does not feel like eating or drinking. Medications and plan of care reviewed with patient and wife both voice understanding of information and agree with plan of care. Encouraged to call hospice with any questions or concerns. Oxygen concentrator ordered for delivery today.   Palliative Performance Scale: 50%        ER Visits/ Hospitalizations in past year: 0  Onset Date of Hospice Diagnosis: 2022  Summary of Disease Progression Leading to Hospice Diagnosis: Excerpt from Dr. Yuliet Hurd note Antwon Gutiérrez is a 81y.o. 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 Wily Samayoa for management of symptoms and end-stage disease. The patients social history includes retired nephrologist, lives at home with his wife, has 2 children who live locally but do not visit often due to not being vaccinated against COVID, lifelong smoker although has not smoked in the last few months, originally from [place], [place]. Palliative Medicine is addressing the following current patient/family concerns: Worsening shortness of breath, precipitous decline in functional status, unable to walk more than 10 feet, not hypoxic but labored breathing and tachycardia, little to no appetite, constipation     Use LCD Guidelines and list features:   ________  A. Disease with distant metastases at presentation OR    ____x____  B. Progression from an earlier stage of disease to metastatic disease with either:                          ________  1. continued decline in spite of therapy                          ____x____  2. patient declines further disease directed therapy          SPIRITUAL/Social/Emotional/psych:     Dr. Jorden Spatz contacted, agrees to serve as attending provider for hospice and provided verbal certification of terminal illness with prognosis of 6 months or less life expectancy. Orders for hospice admission, medications and plan of treatment received. Medication reconciliation completed.         Currently this patient has:  Supplemental O2: 2L PRN            MEDS:  I have reviewed the patient's medication list with MD and identified the following:  Nonformulary medications: Tamsulosin  Unrelated medications: n/a     IDT communication to include MD, , SW, [address] [place] [address] and support team.

## 2022-07-05 NOTE — HOSPICE
LMSW attended admission along with RN Cipriano Garces. LMSW and RN discussed hospice services in depth. 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 Patient engaged in significant life review throughout admission. Patient and family initially confused about differences in LOCs and benefits of hospice at home versus placement. Discussed in depth the criteria for going to [facility] if ever needed. Spouse was overwhelmed. Provided resource guide for spouse to hire caregivers. Their family friends, one of whom is an NP the other a doctor, were present offering support to patient and spouse. Friends will offer continued support until additional caregivers are found. Patient has a daughter who is local who could provide care but she is sick with COVID at the present time. Patient has DDNR in the home, AMD and POA naming spouse as decision maker. Patient has preplanned with [facility] [place]. Patient and spouse have been  62 years. LMSW will follow up with home [address] [facility]. SW team will continue to offer support and be available to address needs that arise.

## 2022-07-06 NOTE — HOSPICE
call initiated to provide comfort and spiritual guidance to the pt, family and CG as they make their journey towards EOL. The patients wife said they were doing well. They declined  services and will reach out of their needs change.

## 2022-07-07 NOTE — HOSPICE
Patient asleep on arrival. Wife present  Patient ambulated slowly into living room on room air  Alert and oriented x 4  Patient has oxygen in the home. Patient is feeling much better today with pain medication change. He has poor appetite and is not drinking well. He has not had a bowel movement in almost 2 weeks. Offered to do a rectal exam. Patient declined. Will use bisacodyl suppository when it arrives.   Patient prefers to use valium instead of lorazepam. Order rec'd from Dr. Chandrakant Og for valium 10 mg q6h prn  Lasix order was decreased to 20 mg daily    NEW MEDICATION INITIATION DOCUMENTATION:  Consulted AT MD to report change in patient status: YES  Obtained Order from Provider for initiation of Symptom relief medication / other medication needed:YES   Documentation completed in Telephone/Visit Note in [facility]  Reason medication is being initiated: shortness of breath/agitation  MD / Provider name consulted re: change in status / initiation of new medication: Zoie Angel Symptom(s): agitation/dyspnea  New Order(s): Valium 10 mg q6h prn  Name of person being taught: Wily Cedeño, wife  Instructions given: YES  Side Effects taught:YES  Response to teaching: Wily Cedeño verbalized understanding

## 2022-07-09 NOTE — HOSPICE
LMSW arrived at the patient's home to complete a routine visit for [facility]. The LMSW was greeted at the front door by the patient's wife Sha Colon. The home was clean and clutter free. The patient is an 80 y/o  male with a BSHPC Dx. Malignant neoplasm metastatic to soft tissue of back. Patient was received sitting on a couch using O2 NC. Patient was alert and oriented x 4. Patient reported that his appetite is poor both solids and liquids. Patient stated that he slept until 0800 which was wonderful. Patient's mood was appropriate and congruent with conversation. Heber is coping one day at a time. The patient asked about a hospice house where he can go indefinitely until he passes. LMSW informed both the patient and Heber that the [facility] is short term as we are governed by Medicare and Medicare does not pay for LTC. The patient did not like the answer and this LMSW explained that a private HHA Agency could assist with caregiving and alleviate the stress from [facility]. LMSW went through the resource guide with [facility] and answered any questions they had. The patient stated that something must be done because he will not stay in the home being bedbound and a burden on his wife. 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 Patient has been  to [facility] for 62 years and has two children; Ainsley Solares and Alva Gipson who lives locally and provide support when they can. LMSW provided community resources, emotional support, and information on the ancillary services.

## 2022-07-13 NOTE — HOSPICE
PRN visit made to administer Fleet's enema. Rectal exam performed prior to administration. No stool felt. Patient positioned on side on bed. Enema administered without difficulty. After 20 minutes, patient ambulated to the bathroom. Passed some liquid stool. He states that he feels it moving down. Patient performed his own digital exam and stated he could feel hard stool. Encouraged patient to lie in bed so RN could try to disimpact. Patient agreed. A large hard mass of stool was felt at the rectal opening. RN able to remove a small amount, but patient did not tolerate well.  He states he will use sorbitol when it arrives and this RN will follow up in the morning

## 2022-07-13 NOTE — PROGRESS NOTES
Problem: Potential for Alteration in Skin Integrity  Goal: Monitor skin for areas of alteration in skin integrity  Description: Patient/family/caregiver will demonstrate ability to care for patient's skin, monitor for areas of breakdown, and demonstrate methods to prevent breakdown during hospice care. Outcome: Progressing Towards Goal     Problem: Risk for Falls  Goal: Free of falls during inpatient stay  Description: Patient will be free of falls during inpatient stay. Outcome: Progressing Towards Goal     Problem: Alteration in Mobility  Goal: Remain as independent as possible and remain safe in environment  Description: Patient will remain as independent as possible and remain safe in their environment. Outcome: Progressing Towards Goal     Problem: Pain  Goal: Assess satisfaction of level of comfort and symptom control  Outcome: Progressing Towards Goal  Goal: *Control of acute pain  Outcome: Progressing Towards Goal     Problem: Anxiety/Agitation  Goal: Verbalize or staff assess the ability to manage anxiety  Description: The patient/family/caregiver will verbalize and demonstrate ability to manage the patient's anxiety throughout hospice care. Outcome: Progressing Towards Goal     Problem: Breathing Pattern - Ineffective  Goal: *Use of effective breathing techniques  Outcome: Progressing Towards Goal     Problem: Pressure Injury - Risk of  Goal: *Prevention of pressure injury  Outcome: Progressing Towards Goal  Note: Pressure Injury Interventions:  Sensory Interventions: Float heels,Keep linens dry and wrinkle-free,Maintain/enhance activity level,Minimize linen layers,Pressure redistribution bed/mattress (bed type),Turn and reposition approx.  every two hours (pillows and wedges if needed)    Moisture Interventions: Check for incontinence Q2 hours and as needed,Maintain skin hydration (lotion/cream),Minimize layers    Activity Interventions: Pressure redistribution bed/mattress(bed type),Increase time out of bed    Mobility Interventions: Float heels,HOB 30 degrees or less,Pressure redistribution bed/mattress (bed type),Turn and reposition approx.  every two hours(pillow and wedges)    Nutrition Interventions: Document food/fluid/supplement intake,Offer support with meals,snacks and hydration    Friction and Shear Interventions: HOB 30 degrees or less,Minimize layers

## 2022-07-13 NOTE — H&P
[facility]  [phone]    Patient Name: Fay Avendaño  YOB: 1940    Date of Provider Hospice Visit: 07/13/22    Level of Care:   [] General Inpatient (GIP)    [] Routine   [x] Respite    Current Location of Care:  [] [facility] [] [facility] [] [facility] [] [facility] [x] [facility]      Principle Hospice Diagnosis: Leiomyosarcoma       HOSPICE SUMMARY     Chart Reviewed for patient's medical history and hospice care plan. Hospice Physician Certification/Recertification Narrative per Howie Lopez / kaleb CARTER:     Patient is a 78-year-old male admitted to hospice care on 7/2/2022 secondary to metastatic leiomyosarcoma. Patient with metastatic disease to the back, malignant pleural effusion, lungs. Patient not a candidate for further cancer directed therapy. Patient is a retired nephrologist.  He had been seen by our outpatient palliative team.  He has had a significant decline in functional status related to the advancement of his cancer. Patient sent to the [facility] hospice house under respite level care secondary caregiver exhaustion. Met with Dr. Whit Cullen for an extended time. Reviewed his history and what led up to his diagnosis. He definitely is having increasing pain to the right scapular area-site of tumor. Disease with extensive progression on recent imaging. He also has noticed increased confusion and his wife is exhausted. He has been sleeping a significant amount and states minimal appetite. He does feel like the oxycodone 15 mg has been effective so far but anticipates he may need to have this adjusted soon.     General-thin, alert, struggles at times coming up with his words  Lungs-definitely diminished on the right at the site of his large tumor, left appears relatively clear  Cardiovascular-appears regular rate and rhythm, 2/6 to 3/6 systolic murmur heard best at the right upper sternal border  Extremities-no significant edema  Neuro nonfocal    Patient being admitted for Respite care x 5 days for   [x]  Caregiver exhaustion and needing break  []  Caregiver unavailable       PLAN   1. Patient's home medications were reviewed and reconciled. Continue with current home medications and plan of care as outlined in chart. 2. Continue current home medication regiment. No changes in his home regiment for medications to manage symptoms for now. We will continue to monitor while he is here at the hospice house and make adjustments accordingly. 3. I will reach out to his wife tomorrow to touch base on how she is doing. Would also like to hear her perspective. 4.  and SW to support family needs. 5. Disposition: Home with hospice once respite stay is completed.

## 2022-07-13 NOTE — HOSPICE
Patient rec'd on sofa wearing oxygen via NC. Wife present  Patient's pain is 2/10 which is tolerable. He states that the roxicodone that he rec'd from [facility] at [address]. He is using 10 mg approximately q6h  He reports no bowel movement in over 2 weeks. He used the bisacodyl suppository as instructed without relief. Requesting Fleets enema. Order obtained from Dr. Herrmann for one time. RN will return with enema and administer this afternoon.      NEW MEDICATION INITIATION DOCUMENTATION:  Consulted AT MD to report change in patient status: YES  Obtained Order from Provider for initiation of Symptom relief medication / other medication needed:YES   Documentation completed in Telephone/Visit Note in [facility]  Reason medication is being initiated: No bowel movement in 2 weeks  MD / Provider name consulted re: change in status / initiation of new medication: Sarika Deleon  New Symptom(s): constipation  New Order(s): Fleet's enema once  Name of person being taught: Orestes Soni  Instructions given: YES  Side Effects taught:YES  Response to teaching: Jun Ramírez understanding

## 2022-07-13 NOTE — HOSPICE
1900 Received report from [facility]. Assumed care of patient. 2005 Shift assessment completed. See flowsheets. Pt lying in bed awake. Pt pleasant, alert and oriented x4. Pt ambulatory to bathroom with assistance. Wears 3L O2 PRN at night. Pt not wearing at time of assessment. Respirations even and unlabored. Murmur noted. Pt has pain at tumor site on back. 2026 Scheduled flomax administered orally. Pt requesting water. Water given to patient. Instructed pt on use of call bell. 2200 Pt lying in bed.    2257 Pt stated pain in back at tumor site, requesting PRN oxycodone. Prior to administration, pt discovered he has been taking 30ml q6h. Pt stating current dosage of 15ml is not relieving pain. Assisted pt to bathroom. Instructed to pull cord when needing assistance back to bed.  5886 Dr Tuan Dorantes called and notified of patients pain, and medication dosage. Per Dr. Tuan Dorantes, verbal order received to increase oxycodone to 20ml q3h prn and will reassess tomorrow after evaluating how night goes. 2320 Pt rang call bell, requesting to go back to bed. Stephanie Espinal assisted pt.  2336 Oxycodone 5ml administered to patient for new dosage. RN spent length of time with patient discussing his diagnosis and conversing with pt. Gave pt two more blankets, increased heat in room as pt stated he was cold. Pt wears brief but removed before lying in bed. Pt stated he had approximately 50ml of concentrated anna urine. Pt placed O2 on as he stated he experiences dyspnea walking from bathroom to bed. Menu choices picked out for tomorrow. Lights dim. 0130 Pt sleeping.  0232 Pt rang out requesting pain medication due to movement in bed. Oxycodone 20ml administered orally. Pt tolerated well. Lights dim. 0430 Pt lying in bed with eyes closed. 0500 Pt rang out requesting assistance to perform ADL's. Set pt up to shave at sink with chair, and call bell within reach.   Pt became dizzy, stated he feels \"hypotensive. \"  /78, , O2 96% on 3L. Assisted pt back to bed. Pt remained in bed for 10m, requested to get back to sink. 0550 Pt back in bed resting. Pt requesting pain medication for tumor related back pain.  0604 Oxycodone 20mg administered orally. Pt offered water. Requested shaving cream.  Will try to locate for patient. 0645 Shaving cream given to patient, remained with pt for 10m conversing. 0700 Report given to oncoming nurse. NAME OF PATIENT:  Fay Avendaño    LEVEL OF CARE:  Respite    REASON FOR GIP:   n/a    *PATIENT REMAINS ELIGIBLE FOR GIP LEVEL OF CARE AS EVIDENCED BY: (MUST BE ADDRESSED OF PATIENT GIP)      REASON FOR RESPITE:  CG exhaustion and needing break    O2 SAFETY:  Concentrator positioning (6\" from furniture/drapes), No petroleum based products on face while oxygen in use and Oxygen sign on the door    FALL INTERVENTIONS PROVIDED:   Implemented/recommended use of non-skid footwear, Implemented/recommended use of fall risk identification flag to all team members, Implemented/recommended assistive devices and encouraged their use, Implemented/recommended resources for alarm system (personal alarm, bed alarm, call bell, etc.) , Implemented/recommended environmental changes (remove hazards, lower bed, improve lighting, etc.) and Implemented/recommended increased supervision/assistance    INTERDISPLINARY COMMUNICATION/COLLABORATION:  Physician, MSW, Deanna and RN, CNA    NEW MEDICATION INITIATION DOCUMENTATION:  Oxycodone increase from 15mg q4h prn to 20mg q3h prn    Reason medication is being initiated:  Increase due to pain    MD / Provider name consulted re: change in status / initiation of new medication:  Change in status    New Symptom(s):  Pain in right upper back at tumor site    New Order(s):  Oxycodone 20mg q3h PRN    Name of the person notified of the changes:  Patient aware    Name of person being taught:  Patient    Instructions given:   Will assess effectiveness throughout the evening    Side Effects taught:  Drowsiness     Response to teaching:  effective      COMFORTABLE DYING MEASURE:  Is Patient/family satisfied with symptom level?  yes    DISCHARGE PLAN:  Pt will return home at end of Respite stay

## 2022-07-13 NOTE — PROGRESS NOTES
1255: pt arrived via Hospital to Home stretcher. Pt sitting up on couch. Pt AOx4, on 3L NC, c/o pain in his back at the tumor site due to how he was laying on the stretcher during transport. He last took oxycodone just prior to transport around 12pm.   1315: pt ambulated from couch to bed with staff presence. Pt educated on use of call bell. Bed alarm on.   1500: pt resting quietly with eyes closed. Bed alarm on, call bell within reach. 1545: pt awake, laying in bed. Asked for temp to be turned up in room. Pt states his pain is currently 2/10 and doesn't need any medication for it. 0652: Dr Mahi Cooley to bedside. 1730: pt rang call bell. RN to bedside. Answered patient's questions, reoriented him to whole room, bathroom, lights, fan, etc.   1800: placed call to wife, updated her on patient arrival, answered questions about visitation, etc. Transferred call into room for patient. 1820: pt rang call bell, requested oxycodone. RN to bedside. Pt states no pain currently but it has been 6 hours since he took a dose and making sure he stays ahead of the pain. When he has pain at the tumor site and around through his rib cage, he describes it as \"pleuritic\" pain. PRN dose of oxycodone given per [facility]

## 2022-07-14 NOTE — HOSPICE
Initial spiritual care visit with the patient while on respite at the [facility]. The patient was lying in his bed resting . He sat up in his bed to visit with the . The patient shared his Ardia Oats but stated he is not religous. He adheres to atheism  The patient shared many life stories and the affects these events have had on his life. The patient shared his grief of his current health situation  He is ready to die for fear of being a burden. The  validated his emotions. The patient is open to the  visiting for conversation and emotional support while he is at the [facility].

## 2022-07-14 NOTE — PROGRESS NOTES
0700-Report received from [facility]. AT [place]  0830-pt laying quietly in bed, awake and oriented; pt ate 100% of breakfast; assessment completed; pt rates back/rib pain 2/10 and requests his next dose of oxycodone  0900-20mg oxycodone administered along with scheduled prednisone  0920-pt getting washed up with assistance of SADAF Yusuf  1000-rounding at the bedside with Dr. Chani Lakhani; changes to POC--increase oxycodone to 30mg  1045-pt asleep in bed  1200-pt ate only bites of lunch/soup  1245-PRN oxycodone 30mg administered for c/o 2/10 pain around his left sided mass; Dr. Chani Lakhani at the bedside--will keep prednisone at 10mg, will scheduled oxycodone 30mg 4x/day; will order senna-s bid  1330-visitors at the bedside  1445-pt asleep in bed  1545-pt assisted to the bathroom by Johana Yusuf CNA  1709-pt sitting up on the side of the bed for dinner; scheduled senna-s and oxycodone administered  1800-pt asleep in bed; easily aroused to voice; pt very confused about the scheduling and timing of his pain medications  1900-report given to DA Ac    NAME OF PATIENT:  [address]:  respite    REASON FOR GIP:   n/a    *PATIENT REMAINS ELIGIBLE FOR GIP LEVEL OF CARE AS EVIDENCED BY: (MUST BE ADDRESSED OF PATIENT GIP)      REASON FOR RESPITE:  Caregiver breakdown    O2 SAFETY:  Concentrator positioning (6\" from furniture/drapes), No petroleum based products on face while oxygen in use and Oxygen sign on the door    FALL INTERVENTIONS PROVIDED:   Implemented/recommended use of non-skid footwear, Implemented/recommended use of fall risk identification flag to all team members, Implemented/recommended resources for alarm system (personal alarm, bed alarm, call bell, etc.) , Implemented/recommended environmental changes (remove hazards, lower bed, improve lighting, etc.) and Implemented/recommended increased supervision/assistance    INTERDISPLINARY COMMUNICATION/COLLABORATION:  Physician, MSW, [place] and RN, CNA    NEW MEDICATION INITIATION DOCUMENTATION:  No new medications    Reason medication is being initiated:  n/a    MD / Provider name consulted re: change in status / initiation of new medication:  n/a    New Symptom(s):  n/a    New Order(s):  n/a    Name of the person notified of the changes:  n/a    Name of person being taught:  n/a    Instructions given:  n/a    Side Effects taught:  n/a    Response to teaching:  n/a      COMFORTABLE DYING MEASURE:  Is Patient/family satisfied with symptom level?  yes    DISCHARGE PLAN:  Home at the end of respite stay

## 2022-07-14 NOTE — PROGRESS NOTES
[facility] to Those in Need  [phone]    Patient Name: Juliana Zimmerman  YOB: 1940    Date of Provider Hospice Visit: 07/14/22    Level of Care:   [] General Inpatient (GIP)    [] Routine   [x] Respite    Current Location of Care:  [] [facility] [place] [] [facility] [] [id number] [] [facility] [x] Hospice House [facility]      Principle Hospice Diagnosis: Leiomyosarcoma       HOSPICE SUMMARY     Chart Reviewed for patient's medical history and hospice care plan. Hospice Physician Certification/Recertification Narrative per Cathy Rojas / kaleb MD:     Patient is a 23-year-old male admitted to hospice care on 7/2/2022 secondary to metastatic leiomyosarcoma. Patient with metastatic disease to the back, malignant pleural effusion, lungs. Patient not a candidate for further cancer directed therapy. Patient is a retired nephrologist.  He had been seen by our outpatient palliative team.  He has had a significant decline in functional status related to the advancement of his cancer. Patient sent to the [facility] hospice house under respite level care secondary caregiver exhaustion. Met with Dr. Kelly Pink for an extended time. Reviewed his history and what led up to his diagnosis. He definitely is having increasing pain to the right scapular area-site of tumor. Disease with extensive progression on recent imaging. He also has noticed increased confusion and his wife is exhausted. He has been sleeping a significant amount and states minimal appetite. He does feel like the oxycodone 15 mg has been effective so far but anticipates he may need to have this adjusted soon. 7/14-patient doing okay overall. Apparently at home, he actually had been using oxycodone 30 mg. Continues to require medicine essentially 4 times a day on a scheduled basis. We discussed scheduling the medication.     General-thin, alert, struggles at times coming up with his words  Lungs-definitely diminished on the right at the site of his large tumor, left appears relatively clear  Cardiovascular-appears regular rate and rhythm, 2/6 to 3/6 systolic murmur heard best at the right upper sternal border  Extremities-no significant edema  Neuro nonfocal    Patient being admitted for Respite care x 5 days for   [x]  Caregiver exhaustion and needing break  []  Caregiver unavailable       PLAN   1. Patient's home medications were reviewed and reconciled. Continue with current home medications and plan of care as outlined in chart. 2. Pain regimen- oxycodone 30 mg 4 times a day scheduled and every 2 hours as needed for breakthrough pain. Continue prednisone at 10 mg daily for now as he agrees to continue the daily dose  3. Schedule senna S twice daily  4. I was able to talk with patient's wife via phone. 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 She knows that this is going to be an ongoing nowak/struggle. Reassured her that we will try her best to have a good regimen of medication prior to him leaving so that hopefully there should be no confusion. 5.  and SW to support family needs. 6. Disposition: Home with hospice once respite stay is completed.

## 2022-07-14 NOTE — PROGRESS NOTES
Problem: Risk for Falls  Goal: Free of falls during inpatient stay  Description: Patient will be free of falls during inpatient stay. Outcome: Progressing Towards Goal     Problem: Pain  Goal: Assess satisfaction of level of comfort and symptom control  Outcome: Progressing Towards Goal  Note: Patient's oxycodone dose and schedule were adjusted to patient's satisfaction and patient's home routine. Patient verbalized pain is at an acceptable level. Problem: Anxiety/Agitation  Goal: Verbalize or staff assess the ability to manage anxiety  Description: The patient/family/caregiver will verbalize and demonstrate ability to manage the patient's anxiety throughout hospice care. Outcome: Progressing Towards Goal  Note: Patient has not required any PRN anxiety medication today. Continue to assess, monitor and treat per POC.

## 2022-07-14 NOTE — PROGRESS NOTES
Problem: Potential for Alteration in Skin Integrity  Goal: Monitor skin for areas of alteration in skin integrity  Description: Patient/family/caregiver will demonstrate ability to care for patient's skin, monitor for areas of breakdown, and demonstrate methods to prevent breakdown during hospice care. Outcome: Progressing Towards Goal     Problem: Risk for Falls  Goal: Free of falls during inpatient stay  Description: Patient will be free of falls during inpatient stay. Outcome: Progressing Towards Goal     Problem: Alteration in Mobility  Goal: Remain as independent as possible and remain safe in environment  Description: Patient will remain as independent as possible and remain safe in their environment. Outcome: Progressing Towards Goal     Problem: Pain  Goal: Assess satisfaction of level of comfort and symptom control  Outcome: Progressing Towards Goal  Goal: *Control of acute pain  Outcome: Progressing Towards Goal     Problem: Anxiety/Agitation  Goal: Verbalize or staff assess the ability to manage anxiety  Description: The patient/family/caregiver will verbalize and demonstrate ability to manage the patient's anxiety throughout hospice care.   Outcome: Progressing Towards Goal     Problem: Breathing Pattern - Ineffective  Goal: *Use of effective breathing techniques  Outcome: Progressing Towards Goal     Problem: Pressure Injury - Risk of  Goal: *Prevention of pressure injury  Outcome: Progressing Towards Goal  Note: Pressure Injury Interventions:  Sensory Interventions: Float heels,Keep linens dry and wrinkle-free    Moisture Interventions: Minimize layers    Activity Interventions: Pressure redistribution bed/mattress(bed type)    Mobility Interventions: Float heels,HOB 30 degrees or less    Nutrition Interventions: Document food/fluid/supplement intake    Friction and Shear Interventions: HOB 30 degrees or less

## 2022-07-14 NOTE — HOSPICE
1900 Received report from [place], RN. Assumed care of patient. 2010 Pts bed alarm going off. Pt trying to get out of bed. SADAF Ibarra and SADAF Wayne redirected pt.    2030 Pt lying in bed, pleasant. Murmur noted. Lung sounds clear. No cough noted. Wearing O2, 3L via NC. Gave pt water. Bed alarm on, lights dim. 2117 Arrived to room, pt asleep. Pt awoke for scheduled pain meds. Oxycodone 30mg administered orally. Pt requested to go back to sleep. Lights dim. 2315 Pt asleep, eyes closed. No facial grimacing. 0115 Pt sleeping, relaxed face. Respirations even and unlabored. 0315 Eyes closed, neutral face. No respiratory distress noted. 0415 Pt requesting to use bathroom. Pt assisted to bathroom with [place], CNA present. Pt denies pain. 0612 Pt remains asleep. Respirations even and unlabored. Neutral facial expression. 0700 Report given to oncoming nurse.     NAME OF PATIENT:  Patric Encinas    LEVEL OF CARE:  Respite    REASON FOR GIP:   n/a    *PATIENT REMAINS ELIGIBLE FOR [facility] LEVEL OF CARE AS EVIDENCED BY: (MUST BE ADDRESSED OF PATIENT GIP)      REASON FOR RESPITE:  Caregiver breakdown    O2 SAFETY:  Concentrator positioning (6\" from furniture/drapes), No petroleum based products on face while oxygen in use and Oxygen sign on the door    FALL INTERVENTIONS PROVIDED:   Implemented/recommended use of non-skid footwear, Implemented/recommended use of fall risk identification flag to all team members, Implemented/recommended assistive devices and encouraged their use, Implemented/recommended resources for alarm system (personal alarm, bed alarm, call bell, etc.) , Implemented/recommended environmental changes (remove hazards, lower bed, improve lighting, etc.) and Implemented/recommended increased supervision/assistance    INTERDISPLINARY COMMUNICATION/COLLABORATION:  Physician, MSW, [place] and RN, CNA    NEW MEDICATION INITIATION DOCUMENTATION:  n/a    Reason medication is being initiated:  n/a    MD / Provider name consulted re: change in status / initiation of new medication:  n/a    New Symptom(s):  n/a    New Order(s):  n/a    Name of the person notified of the changes:  n/a    Name of person being taught:  n/a    Instructions given:  n/a    Side Effects taught:  n/a    Response to teaching:  n/a      COMFORTABLE DYING MEASURE:  Is Patient/family satisfied with symptom level?  yes    DISCHARGE PLAN:  Pt scheduled to return home 7/18 with home hospice to follow.

## 2022-07-15 NOTE — HOSPICE
NAME OF PATIENT:  Katrin Hein    LEVEL OF CARE:  Respite    REASON FOR GIP:   NA    *PATIENT REMAINS ELIGIBLE FOR GIP LEVEL OF CARE AS EVIDENCED BY: (MUST BE ADDRESSED OF PATIENT GIP)  NA    REASON FOR RESPITE:  Caregiver breakdown    O2 SAFETY:  Tanks stored in weiner  and No petroleum based products on face while oxygen in use    FALL INTERVENTIONS PROVIDED:   Implemented/recommended use of non-skid footwear, Implemented/recommended use of fall risk identification flag to all team members, Implemented/recommended assistive devices and encouraged their use, Implemented/recommended resources for alarm system (personal alarm, bed alarm, call bell, etc.) , Implemented/recommended environmental changes (remove hazards, lower bed, improve lighting, etc.) and Implemented/recommended increased supervision/assistance    INTERDISPLINARY COMMUNICATION/COLLABORATION:  Physician, MSW and RN, CNA    NEW MEDICATION INITIATION DOCUMENTATION:  NA    Reason medication is being initiated:  NA    MD / Provider name consulted re: change in status / initiation of new medication:  NA    New Symptom(s):  NA    New Order(s):  NA    Name of the person notified of the changes:  NA    Name of person being taught:  NA    Instructions given:  NA    Side Effects taught:  NA    Response to teaching: NA      COMFORTABLE DYING MEASURE:  Is Patient/family satisfied with symptom level?  yes    DISCHARGE PLAN:  Home  0700 Received report from [facility]. 0800 Patient is sitting on side of bed eating breakfast. Please see flow sheet for assessment. Gave scheduled medication see MAR.  1000 Patient is sleeping with relaxed face. 1200 Patient is resting in bed with visitors at bedside. 1400 Patient is resting in bed quietly. 1600 Patient is sleeping. 1740 Patient called out to go the rest room and get cleaned  Up. Gave scheduled medications see MAR.  1900 Report given.

## 2022-07-15 NOTE — PROGRESS NOTES
Problem: Potential for Alteration in Skin Integrity  Goal: Monitor skin for areas of alteration in skin integrity  Description: Patient/family/caregiver will demonstrate ability to care for patient's skin, monitor for areas of breakdown, and demonstrate methods to prevent breakdown during hospice care. Outcome: Progressing Towards Goal     Problem: Risk for Falls  Goal: Free of falls during inpatient stay  Description: Patient will be free of falls during inpatient stay. Outcome: Progressing Towards Goal     Problem: Alteration in Mobility  Goal: Remain as independent as possible and remain safe in environment  Description: Patient will remain as independent as possible and remain safe in their environment. Outcome: Progressing Towards Goal     Problem: Pain  Goal: Assess satisfaction of level of comfort and symptom control  Outcome: Progressing Towards Goal  Goal: *Control of acute pain  Outcome: Progressing Towards Goal     Problem: Anxiety/Agitation  Goal: Verbalize or staff assess the ability to manage anxiety  Description: The patient/family/caregiver will verbalize and demonstrate ability to manage the patient's anxiety throughout hospice care.   Outcome: Progressing Towards Goal     Problem: Breathing Pattern - Ineffective  Goal: *Use of effective breathing techniques  Outcome: Progressing Towards Goal     Problem: Pressure Injury - Risk of  Goal: *Prevention of pressure injury  Outcome: Progressing Towards Goal  Note: Pressure Injury Interventions:  Sensory Interventions: Assess changes in LOC    Moisture Interventions: Absorbent underpads    Activity Interventions: Pressure redistribution bed/mattress(bed type)    Mobility Interventions: Pressure redistribution bed/mattress (bed type)    Nutrition Interventions: Document food/fluid/supplement intake    Friction and Shear Interventions: HOB 30 degrees or less                Problem: Falls - Risk of  Goal: *Absence of Falls  Description: Document Jamie Vasquez Fall Risk and appropriate interventions in the flowsheet.   Outcome: Progressing Towards Goal  Note: Fall Risk Interventions:  Mobility Interventions: Bed/chair exit alarm         Medication Interventions: Bed/chair exit alarm    Elimination Interventions: Bed/chair exit alarm              Problem: Patient Education: Go to Patient Education Activity  Goal: Patient/Family Education  Outcome: Progressing Towards Goal

## 2022-07-15 NOTE — HOSPICE
This LMSW conducted an emotional support visit with the patient, Dr. Karishma Agudelo. When LMSW entered the room, the patient was sitting on the side of his bed. Two former colleagues were present. They ended their visit. 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 We talked about the importance of the patient and physician relationship and communication. Patient also shared his experiences in [facility] and with his family. LMSW provided active listening and reflection. LMSW acknowledged the value of the stories he shared and expressed appreciation for the conversation and the opportunity to get to know him. After the visit concluded, patient corrected a date on the inpatient agreement form. Patient stated he would appreciate another visit. Social work to continue to follow.

## 2022-07-15 NOTE — PROGRESS NOTES
Problem: Potential for Alteration in Skin Integrity  Goal: Monitor skin for areas of alteration in skin integrity  Description: Patient/family/caregiver will demonstrate ability to care for patient's skin, monitor for areas of breakdown, and demonstrate methods to prevent breakdown during hospice care. Outcome: Progressing Towards Goal     Problem: Risk for Falls  Goal: Free of falls during inpatient stay  Description: Patient will be free of falls during inpatient stay. Outcome: Progressing Towards Goal     Problem: Alteration in Mobility  Goal: Remain as independent as possible and remain safe in environment  Description: Patient will remain as independent as possible and remain safe in their environment. Outcome: Progressing Towards Goal     Problem: Pain  Goal: Assess satisfaction of level of comfort and symptom control  Outcome: Progressing Towards Goal  Goal: *Control of acute pain  Outcome: Progressing Towards Goal     Problem: Anxiety/Agitation  Goal: Verbalize or staff assess the ability to manage anxiety  Description: The patient/family/caregiver will verbalize and demonstrate ability to manage the patient's anxiety throughout hospice care.   Outcome: Progressing Towards Goal     Problem: Breathing Pattern - Ineffective  Goal: *Use of effective breathing techniques  Outcome: Progressing Towards Goal     Problem: Pressure Injury - Risk of  Goal: *Prevention of pressure injury  Outcome: Progressing Towards Goal  Note: Pressure Injury Interventions:  Sensory Interventions: Assess changes in LOC,Keep linens dry and wrinkle-free    Moisture Interventions: Absorbent underpads    Activity Interventions: Pressure redistribution bed/mattress(bed type)    Mobility Interventions: Pressure redistribution bed/mattress (bed type)    Nutrition Interventions: Document food/fluid/supplement intake    Friction and Shear Interventions: HOB 30 degrees or less,Minimize layers                Problem: Falls - Risk of  Goal: *Absence of Falls  Description: Document Bayard Fall Risk and appropriate interventions in the flowsheet.   Outcome: Progressing Towards Goal  Note: Fall Risk Interventions:  Mobility Interventions: Bed/chair exit alarm         Medication Interventions: Bed/chair exit alarm,Patient to call before getting OOB    Elimination Interventions: Bed/chair exit alarm,Call light in reach              Problem: Patient Education: Go to Patient Education Activity  Goal: Patient/Family Education  Outcome: Progressing Towards Goal

## 2022-07-16 NOTE — PROGRESS NOTES
Problem: Potential for Alteration in Skin Integrity  Goal: Monitor skin for areas of alteration in skin integrity  Description: Patient/family/caregiver will demonstrate ability to care for patient's skin, monitor for areas of breakdown, and demonstrate methods to prevent breakdown during hospice care. Outcome: Progressing Towards Goal     Problem: Risk for Falls  Goal: Free of falls during inpatient stay  Description: Patient will be free of falls during inpatient stay. Outcome: Progressing Towards Goal     Problem: Alteration in Mobility  Goal: Remain as independent as possible and remain safe in environment  Description: Patient will remain as independent as possible and remain safe in their environment. Outcome: Progressing Towards Goal     Problem: Pain  Goal: Assess satisfaction of level of comfort and symptom control  Outcome: Progressing Towards Goal  Goal: *Control of acute pain  Outcome: Progressing Towards Goal     Problem: Anxiety/Agitation  Goal: Verbalize or staff assess the ability to manage anxiety  Description: The patient/family/caregiver will verbalize and demonstrate ability to manage the patient's anxiety throughout hospice care.   Outcome: Progressing Towards Goal     Problem: Breathing Pattern - Ineffective  Goal: *Use of effective breathing techniques  Outcome: Progressing Towards Goal     Problem: Pressure Injury - Risk of  Goal: *Prevention of pressure injury  Outcome: Progressing Towards Goal  Note: Pressure Injury Interventions:  Sensory Interventions: Assess changes in LOC    Moisture Interventions: Absorbent underpads    Activity Interventions: Pressure redistribution bed/mattress(bed type)    Mobility Interventions: Pressure redistribution bed/mattress (bed type)    Nutrition Interventions: Document food/fluid/supplement intake    Friction and Shear Interventions: HOB 30 degrees or less                Problem: Falls - Risk of  Goal: *Absence of Falls  Description: Document Mk Snow Fall Risk and appropriate interventions in the flowsheet.   Outcome: Progressing Towards Goal  Note: Fall Risk Interventions:  Mobility Interventions: Bed/chair exit alarm         Medication Interventions: Bed/chair exit alarm    Elimination Interventions: Bed/chair exit alarm              Problem: Patient Education: Go to Patient Education Activity  Goal: Patient/Family Education  Outcome: Progressing Towards Goal

## 2022-07-16 NOTE — HOSPICE
0700 Report received from [facility]. 0424 Patient is sleeping with relaxed face, please see flow sheet for assessment. 6900 Patient is resting in bed with relaxed face. 1130 Patient called out for prn dose of oxycodone for pain 30 mg. Patient is resting in bed.  1330 Patient is resting quietly watching TV.  1530  Patient is sleeping with a relaxed face. 1730 Patient is eating dinner and has taken scheduled dose of senna- docusate. 1800 Oxycodone 30 mg given. 1900 Report given. NAME OF PATIENT:  Patric Encinas    LEVEL OF CARE:  Respite    REASON FOR GIP:   NA    *PATIENT REMAINS ELIGIBLE FOR GIP LEVEL OF CARE AS EVIDENCED BY: (MUST BE ADDRESSED OF PATIENT GIP)  NA    REASON FOR RESPITE:  NA    O2 SAFETY:  Tanks stored in weiner  and No petroleum based products on face while oxygen in use    FALL INTERVENTIONS PROVIDED:   Implemented/recommended use of fall risk identification flag to all team members, Implemented/recommended assistive devices and encouraged their use, Implemented/recommended resources for alarm system (personal alarm, bed alarm, call bell, etc.) , Implemented/recommended environmental changes (remove hazards, lower bed, improve lighting, etc.) and Implemented/recommended increased supervision/assistance    INTERDISPLINARY COMMUNICATION/COLLABORATION:  Physician, MSW and RN, CNA    NEW MEDICATION INITIATION DOCUMENTATION:  NA    Reason medication is being initiated:  АННА    MD / Provider name consulted re: change in status / initiation of new medication:  NA    New Symptom(s):  NA    New Order(s):  NA    Name of the person notified of the changes:  NA    Name of person being taught:  NA    Instructions given:  NA    Side Effects taught:  NA    Response to teaching:  NA      COMFORTABLE DYING MEASURE:  Is Patient/family satisfied with symptom level?  yes    DISCHARGE PLAN:  Home with hospice.

## 2022-07-16 NOTE — HOSPICE
1900 Received report from Ana Laura Kirby, [place]. Assumed care of patient. 1940- Pt lying in bed, watching TV.    2054 - Shift assessment completed. See flowsheets. Respirations even and unlabored. Wearing O2 continuous. Scheduled Flomax administered orally. Pt requesting pain medication at scheduled time. Pain is at tumor site. 2249 Scheduled oxycodone administered orally. Pt tolerated well. Lights dim. 0049 Pt sleeping, lights off.  0130 Rounding, pt awake, asked what time it was. Lights dim, pt returning to sleep. 0330 Pt sleeping, eyes closed. Bed alarm on.  0405 Pt rang call bell requesting pain meds and morning meds. PRN Oxycodone given. Instructed that other morning meds are due in a few more hours. Gave pt water. Pt stated that he feels \"wheezy\" but does not typically treat for it. Pt has moist cough. Requesting to return to sleep. 0530 Pt sleeping.  0625 Pt awake, lying in bed. Pt stated pain level seems to be increasing since switching to our oxycodone. When asked what he would rate his pain, pt states \"none. \"  Informed will pass along to dayshift nurse. Pt also discussing details about going home. Informed will have Dr. Anderson Marsh discuss details.   0700 Report given to Ana Laura Kirby RN.    NAME OF PATIENT:  Luis Aguilar    LEVEL OF CARE:  Respite    REASON FOR GIP:   n/a    *PATIENT REMAINS ELIGIBLE FOR [facility] LEVEL OF CARE AS EVIDENCED BY: (MUST BE ADDRESSED OF PATIENT GIP)      REASON FOR RESPITE:  Caregiver breakdown    O2 SAFETY:  Concentrator positioning (6\" from furniture/drapes), No petroleum based products on face while oxygen in use and Oxygen sign on the door    FALL INTERVENTIONS PROVIDED:   Implemented/recommended use of non-skid footwear, Implemented/recommended use of fall risk identification flag to all team members, Implemented/recommended assistive devices and encouraged their use, Implemented/recommended resources for alarm system (personal alarm, bed alarm, call bell, etc.) , Implemented/recommended environmental changes (remove hazards, lower bed, improve lighting, etc.) and Implemented/recommended increased supervision/assistance    INTERDISPLINARY COMMUNICATION/COLLABORATION:  Physician, MSW, [place] and RN, CNA    NEW MEDICATION INITIATION DOCUMENTATION:  n/a    Reason medication is being initiated:  n/a    MD / Provider name consulted re: change in status / initiation of new medication:  n/a    New Symptom(s):  n/a    New Order(s):  n/a    Name of the person notified of the changes:  n/a    Name of person being taught:  n/a    Instructions given:  n/a    Side Effects taught:  n/a    Response to teaching:  n/a      COMFORTABLE DYING MEASURE:  Is Patient/family satisfied with symptom level?  yes    DISCHARGE PLAN:  Pt to return home 7/18 with home hospice to follow.

## 2022-07-17 NOTE — HOSPICE
1900 Report received from Adwoa Abraham, [facility] Patient in bed watching tv. Wants to know when his last dose of oxycodone. Informed him at 1800. He will let this RN know when he's ready for his next dose. 2035 Patient in bed watching tv. RN assessment and vitals completed. Menu left with patient. 2220 Spent thirty minutes with patient. Patient talked about his journey since his diagnosis. Discussed his bowel habits. Asked about a scope to check for stool. Explained we don't do that. 2255 Administered patients scheduled flomax and oxycodone. Helped patient fold his blankets. Assisted him to the bathroom. 2330 Patient resting quietly with eyes closed, appears to be sleeping.  0125 Patient rang out, stated he was having trouble breathing. Assisted patient to sit on the side of the bed. Patient stated he felt like he had the Rwanywayanyday jeebies. \" Patient requested this nurse listen to his lungs. Lung sounds clear to diminished. Also wanted his vitals taken. All vitals WNL, see flowsheet. Wanted to know why nurse didn't use automated BP cuff. Patient was checking his radial pulse, stated it was 64 and irregular. Asked this nurse to check, pulse 98 and regular. Patient asking about EKG, informed him we don't do that here. Patient trying to self diagnose, coming up with different reasons for his anxiety. . Patient agreed to take PRN valium. This nurse stayed in the room until patient closed his eyes and was resting quietly. 0150 Patient resting quietly with eyes closed, lightly snoring, respirations unlabored. 0330 Resting quietly on his back with head of bed elevated. Eyes are closed. Neutral facial expression. 6457 Patient resting quietly with eyes closed. Respirations even and unlabored. 0091 Patient resting quietly with eyes closed, respirations unlabored, neutral facial expression.   0700 Report given to Adwoa Abraham RN      NAME OF PATIENT:  Ida Christianson    LEVEL OF CARE:  Respite    REASON FOR GIP:   n/a    *PATIENT REMAINS ELIGIBLE FOR GIP LEVEL OF CARE AS EVIDENCED BY: (MUST BE ADDRESSED OF PATIENT GIP)      REASON FOR RESPITE:  Caregiver exhaustion    O2 SAFETY:  Concentrator positioning (6\" from furniture/drapes), No petroleum based products on face while oxygen in use and Oxygen sign on the door    FALL INTERVENTIONS PROVIDED:   Implemented/recommended use of non-skid footwear, Implemented/recommended use of fall risk identification flag to all team members, Implemented/recommended resources for alarm system (personal alarm, bed alarm, call bell, etc.) , Implemented/recommended environmental changes (remove hazards, lower bed, improve lighting, etc.) and Implemented/recommended increased supervision/assistance    INTERDISPLINARY COMMUNICATION/COLLABORATION:  Physician, MSW, [place] and RN, CNA    NEW MEDICATION INITIATION DOCUMENTATION:  n/a    Reason medication is being initiated:  n/a    MD / Provider name consulted re: change in status / initiation of new medication:  n/a    New Symptom(s):  n/a    New Order(s):  n/a    Name of the person notified of the changes:  n/a    Name of person being taught:  n/a    Instructions given:  n/a    Side Effects taught:  n/a    Response to teaching:  n/a      COMFORTABLE DYING MEASURE:  Is Patient/family satisfied with symptom level?  yes    DISCHARGE PLAN:  Patient to return home at end of respite stay.

## 2022-07-17 NOTE — PROGRESS NOTES
Problem: Potential for Alteration in Skin Integrity  Goal: Monitor skin for areas of alteration in skin integrity  Description: Patient/family/caregiver will demonstrate ability to care for patient's skin, monitor for areas of breakdown, and demonstrate methods to prevent breakdown during hospice care. Outcome: Progressing Towards Goal     Problem: Risk for Falls  Goal: Free of falls during inpatient stay  Description: Patient will be free of falls during inpatient stay. Outcome: Progressing Towards Goal     Problem: Alteration in Mobility  Goal: Remain as independent as possible and remain safe in environment  Description: Patient will remain as independent as possible and remain safe in their environment. Outcome: Progressing Towards Goal     Problem: Pain  Goal: Assess satisfaction of level of comfort and symptom control  Outcome: Progressing Towards Goal  Goal: *Control of acute pain  Outcome: Progressing Towards Goal     Problem: Anxiety/Agitation  Goal: Verbalize or staff assess the ability to manage anxiety  Description: The patient/family/caregiver will verbalize and demonstrate ability to manage the patient's anxiety throughout hospice care.   Outcome: Progressing Towards Goal     Problem: Breathing Pattern - Ineffective  Goal: *Use of effective breathing techniques  Outcome: Progressing Towards Goal     Problem: Pressure Injury - Risk of  Goal: *Prevention of pressure injury  Outcome: Progressing Towards Goal  Note: Pressure Injury Interventions:  Sensory Interventions: Assess changes in LOC    Moisture Interventions: Absorbent underpads    Activity Interventions: Pressure redistribution bed/mattress(bed type)    Mobility Interventions: Pressure redistribution bed/mattress (bed type)    Nutrition Interventions: Document food/fluid/supplement intake    Friction and Shear Interventions: HOB 30 degrees or less                Problem: Falls - Risk of  Goal: *Absence of Falls  Description: Document Cisneros Reason Fall Risk and appropriate interventions in the flowsheet.   Outcome: Progressing Towards Goal  Note: Fall Risk Interventions:  Mobility Interventions: Bed/chair exit alarm         Medication Interventions: Bed/chair exit alarm    Elimination Interventions: Bed/chair exit alarm              Problem: Patient Education: Go to Patient Education Activity  Goal: Patient/Family Education  Outcome: Progressing Towards Goal

## 2022-07-17 NOTE — HOSPICE
1900- Received report from [place], [address] [place] [address]. Assumed care of patient. 1948 - Pt call out requesting to use BSC. Assisted by RN and SADAF Wayne. Pt urinated yellow, cloudy urine. Assisted back to bed, pt dyspneic, non-productive cough. Wearing O2, 3L via NC. . Murmur noted. Pt has pain but did not specify number. Offered pain medication. Pt hesitant, stated pain med not working as well. Educated pt on taking pain medication when needed. Pt accepting to take pain medication and Valium for anxiety. Water given. Lights dim, bed alarm on.  2001 - PRN oxycodone administered orally. 2006 - PRN diazepam administered. 2204 - Pt asleep. Awoke to take tamsulosin. Pt tolerated well. Pt drowsy, declined to take oxycodone now. Pt returned to sleep. 2330 Pt asleep, no facial grimacing. Respirations even and unlabored. 0130 Pt sleeping with relaxed face. No respiratory distress noted. Pt lying on right side. 0315 Pt rang call bell requesting to use BSC. No output. Pt assisted back to bed. Lights dim. 0515 Pt rang call bell requesting to use bathroom. Assisted pt to bathroom. Kathy cloudy urine noted, unmeasured. Pt dyspneic when walking back to bed. Offered pain medication and Valium, pt declined. Pt lying on right side, lights dim. 9463 Pt lying on right side asleep. Relaxed face, no respiratory distress noted. 0700  Report given to the oncoming nurse.       NAME OF PATIENT:  Paulo Bettencourt    LEVEL OF CARE:  Respite    REASON FOR GIP:   n/a    *PATIENT REMAINS ELIGIBLE FOR GIP LEVEL OF CARE AS EVIDENCED BY: (MUST BE ADDRESSED OF PATIENT GIP)      REASON FOR RESPITE:  Caregiver breakdown    O2 SAFETY:  Concentrator positioning (6\" from furniture/drapes), No petroleum based products on face while oxygen in use and Oxygen sign on the door    FALL INTERVENTIONS PROVIDED:   Implemented/recommended use of non-skid footwear, Implemented/recommended use of fall risk identification flag to all team members, Implemented/recommended assistive devices and encouraged their use, Implemented/recommended resources for alarm system (personal alarm, bed alarm, call bell, etc.) , Implemented/recommended environmental changes (remove hazards, lower bed, improve lighting, etc.) and Implemented/recommended increased supervision/assistance    INTERDISPLINARY COMMUNICATION/COLLABORATION:  Physician, MSW, [place] and RN, CNA    NEW MEDICATION INITIATION DOCUMENTATION:  n/a    Reason medication is being initiated:  n/a    MD / Provider name consulted re: change in status / initiation of new medication:  n/a    New Symptom(s):  n/a    New Order(s):  n/a    Name of the person notified of the changes:  n/a    Name of person being taught:  n/a    Instructions given:  n/a    Side Effects taught:  n/a    Response to teaching:  n/a      COMFORTABLE DYING MEASURE:  Is Patient/family satisfied with symptom level?  yes    DISCHARGE PLAN:  Home with hospice to follow.

## 2022-07-17 NOTE — HOSPICE
0700 Report received from [address] [facility].  2333 Patient is awake complaining of radiating pain starting in lower abdomen to mid chest. Patient is very anxious about going home tomorrow feels like he is not ready. Gave patient scheduled dose of oxycodone 30 mg, and prn dose of Valium 10 mg. Patient refused all other scheduled morning medications. Will continue to monitor. 0930 Patient is sleeping with a relaxed face. 1129 Patient is sleeping in supine position with relaxed face. 1330 Patient called out stating he had dry mouth. RN gave patient water. He wants to know if it is a reaction to the medications he is taking. He also requested a specialist to come investigate why he is having stomach pain. RN explain that in hospice we provide comfort care. He states so I should just give up and let it happen nothing is going to help. RN asks patient if he would like pain medication, he refuses, demands to talk to his wife, RN provides phone to patient and he calls his wife. Patient speaks with his wife and calls RN back into the room and wants RN to speak with wife. Patient's wife asks nurse what is happening, RN explains that patient has had new complaint of pain starting yesterday evening and is having pain and anxiety. Patient has been getting scheduled oxycodone along with prn Valium to help with anxiety. Wife is concerned that his pain cannot be managed at home. RN explained that medication can be adjusted in the home setting also. Both patient and wife are concerned about patient going home tomorrow. After phone call patient stated that he feels his wife is falling apart and needs psychological help because of what is happening to him. 1400 Patient is complaining of stomach pain radiating to esophagus, refuses medications. 1500 Patient is asleep with relaxed face turned to right side. 1600 Patient is sleeping with relaxed face in supine position. 1700 Patient called out stated that he feels like he has a fever. RN checked temperature it is 100.4 F. RN asked if patient would like Tylenol for fever he refused. Room is 77 degrees, patient has sweatshirt and sweat pants on. RN asked if he felt it was warm in the room, he stated yes but did not want the room cooled down. RN asked if patient was in pain he states in the rib cage area but not bad enough that he wants his pain medication. Patient states that he does not want to make through the night, he feels like he is done with life, just pain ahead with no more good days. Patient asked for emesis bag in case he needed to vomit. 1745 Patient is is lying on his right side with relaxed face and eyes closed. [facility] worker came to speak with patient about concerns about going home. 1900 patient called out to be repositioned in bed. 1910 Report given.     NAME OF PATIENT:  Armida Nyhan    LEVEL OF CARE:  Respite    REASON FOR GIP:   NA    *PATIENT REMAINS ELIGIBLE FOR GIP LEVEL OF CARE AS EVIDENCED BY: (MUST BE ADDRESSED OF PATIENT GIP)  NA    REASON FOR RESPITE:  Caregiver breakdown    O2 SAFETY:  Tanks stored in weiner  and No petroleum based products on face while oxygen in use    FALL INTERVENTIONS PROVIDED:   Implemented/recommended use of non-skid footwear, Implemented/recommended use of fall risk identification flag to all team members, Implemented/recommended assistive devices and encouraged their use, Implemented/recommended resources for alarm system (personal alarm, bed alarm, call bell, etc.) , Implemented/recommended environmental changes (remove hazards, lower bed, improve lighting, etc.) and Implemented/recommended increased supervision/assistance    INTERDISPLINARY COMMUNICATION/COLLABORATION:  Physician, MSW and RN, CNA    NEW MEDICATION INITIATION DOCUMENTATION:  NA    Reason medication is being initiated:  NA    MD / Provider name consulted re: change in status / initiation of new medication:  NA    New Symptom(s):  NA    New Order(s): NA    Name of the person notified of the changes:  NA    Name of person being taught:  NA    Instructions given:  NA    Side Effects taught:  NA    Response to teaching:  NA      COMFORTABLE DYING MEASURE:  Is Patient/family satisfied with symptom level?  yes    DISCHARGE PLAN:  Home with hospice.

## 2022-07-17 NOTE — PROGRESS NOTES
Problem: Potential for Alteration in Skin Integrity  Goal: Monitor skin for areas of alteration in skin integrity  Description: Patient/family/caregiver will demonstrate ability to care for patient's skin, monitor for areas of breakdown, and demonstrate methods to prevent breakdown during hospice care. Outcome: Progressing Towards Goal     Problem: Risk for Falls  Goal: Free of falls during inpatient stay  Description: Patient will be free of falls during inpatient stay. Outcome: Progressing Towards Goal     Problem: Alteration in Mobility  Goal: Remain as independent as possible and remain safe in environment  Description: Patient will remain as independent as possible and remain safe in their environment. Outcome: Progressing Towards Goal     Problem: Pain  Goal: Assess satisfaction of level of comfort and symptom control  Outcome: Progressing Towards Goal     Problem: Anxiety/Agitation  Goal: Verbalize or staff assess the ability to manage anxiety  Description: The patient/family/caregiver will verbalize and demonstrate ability to manage the patient's anxiety throughout hospice care.   Outcome: Progressing Towards Goal     Problem: Breathing Pattern - Ineffective  Goal: *Use of effective breathing techniques  Outcome: Progressing Towards Goal     Problem: Pressure Injury - Risk of  Goal: *Prevention of pressure injury  Outcome: Progressing Towards Goal  Note: Pressure Injury Interventions:  Sensory Interventions: Assess changes in LOC    Moisture Interventions: Absorbent underpads    Activity Interventions: Pressure redistribution bed/mattress(bed type)    Mobility Interventions: Pressure redistribution bed/mattress (bed type)    Nutrition Interventions: Document food/fluid/supplement intake,Offer support with meals,snacks and hydration    Friction and Shear Interventions: HOB 30 degrees or less                Problem: Falls - Risk of  Goal: *Absence of Falls  Description: Document Jojo Fall Risk and appropriate interventions in the flowsheet.   Outcome: Progressing Towards Goal  Note: Fall Risk Interventions:  Mobility Interventions: Bed/chair exit alarm,Patient to call before getting OOB         Medication Interventions: Bed/chair exit alarm,Patient to call before getting OOB    Elimination Interventions: Bed/chair exit alarm

## 2022-07-18 NOTE — HOSPICE
1900 Received report from Lillian Jackson, [facility]. Assumed care of patient. 1930 Pt lying in bed, watching TV.  2014 Scheduled Dilaudid and Valium administered orally. Pt tolerated well. Shift assessment completed. See flowsheets. Pt lying in supine position. Repositioned pt up in bed. Abdomen flat and soft. Pt not wearing O2. Dyspneic while resting. O2 placed back on patient. Skin pale, ashen color. Pt has periods of confusion. Skin intact, wearing brief. Lights dim, bed alarm on.    2145 Pt asleeping, no respiratory distress noted. 2303 Scheduled Flomax and Dilaudid administered. Awoke pt to take medication. No facial grimacing, respirations unlabored. Lights dim. 0103 Pt asleep, eyes closed. No facial grimacing. Respirations even and unlabored. 0230 Pt sleeping, eyes closed. Relaxed facial expression. 0351 Pt sleeping, eyes closed. 6641 Scheduled Dilaudid and Valium administered orally. Pt awoke for medication. Incontinent of stool. Incontinence care provided by Pennie Buerger, CNA and RN. Brown, loose stool noted. Brief applied. Pt moaned when turning. Pt clammy, temp 98.7. Cool rag applied to forehead and room temp adjusted. Pt lying in supine position. Pt sleeping. Lights dim, call bell within reach. Bed alarm on.  0600 Pt asleep, supine position. No facial grimacing.  [id number]  Relaxed facial expression. Respirations even and unlabored.   0700 Report given to Jarred Mistry RN.      NAME OF PATIENT:  [facility]    LEVEL OF CARE:  Routine    REASON FOR GIP:   n/a    *PATIENT REMAINS ELIGIBLE FOR GIP LEVEL OF CARE AS EVIDENCED BY: (MUST BE ADDRESSED OF PATIENT GIP)      REASON FOR RESPITE:  n/a    O2 SAFETY:  Concentrator positioning (6\" from furniture/drapes), No petroleum based products on face while oxygen in use and Oxygen sign on the door    FALL INTERVENTIONS PROVIDED:   Implemented/recommended use of non-skid footwear, Implemented/recommended use of fall risk identification flag to all team members, Implemented/recommended assistive devices and encouraged their use, Implemented/recommended resources for alarm system (personal alarm, bed alarm, call bell, etc.) , Implemented/recommended environmental changes (remove hazards, lower bed, improve lighting, etc.) and Implemented/recommended increased supervision/assistance    INTERDISPLINARY COMMUNICATION/COLLABORATION:  Physician, MSW, Deanna and RN, CNA    NEW MEDICATION INITIATION DOCUMENTATION:  n/a    Reason medication is being initiated:  n/a    MD / Provider name consulted re: change in status / initiation of new medication:  n/a    New Symptom(s):  n/a    New Order(s):  n/a    Name of the person notified of the changes:  n/a    Name of person being taught:  n/a    Instructions given:  n/a    Side Effects taught:  n/a    Response to teaching:  n/a      COMFORTABLE DYING MEASURE:  Is Patient/family satisfied with symptom level?  yes    DISCHARGE PLAN:  Return home with hospice

## 2022-07-18 NOTE — HOSPICE
0700  Report received from [place], [facility]    0730  Pt resting on left side in bed with eyes closed and leg dangling off bed. Neutral facial expression and unlabored respirations noted. Replaced removed 02 NC, repositioned pt into bed, lifted side rail, and set bed alarm. Pt woke briefly, appears very drowsy, denies discomfort and needs. 0840  Pt c/o sharp stomach pain in central abdomen. New since early AM.  Denies nausea, dyspnea, indigestion. Declines breakfast, states he has no appetite. Scheduled prednisone administered whole with water. No difficulty swallowing noted. Pt declined scheduled Oxycodone and Pericolace. [place] with Dr. Anderson Marsh. SW and Clinical mgr also present. After extensive discussion, pt to change to Routine level of care eff today, trans canceled. New orders received to manage pt's anxiety and complaints of pain and constipation. 1200   Scheduled medications administered. Revised medication regimen reviewed with pt. Pt expressed understanding and agreement with plan of care. 1400  Pt resting quietly with eyes closed. Appears to be sleeping. Neutral facial expression, unlabored respirations noted. 1600  Pt requesting assistance with getting up to \"go downstairs to make some phone calls\"  - pt confused, calm, easily reoriented and redirected. Replaced Pt's removed 02 NC. Pt denies pain, denies need to urinate. Reminded pt that he has not voided since this AM and offered assistance. Declined. 1700  Scheduled Dilaudid and Lactulose administered. Pt alert, oriented, sitting up in bed eating dinner. No difficulty swallowing noted. States that he is certain his bladder is empty. Discussed figueroa catheter as an option, refused. Provided Pt with a urinal.  Pt ate 80% of his dinner.       NAME OF PATIENT:  Luis Aguilar    LEVEL OF CARE:  Routine    REASON FOR GIP: NA    *PATIENT REMAINS ELIGIBLE FOR GIP LEVEL OF CARE AS EVIDENCED BY: (MUST BE ADDRESSED OF PATIENT GIP)  NA    REASON FOR RESPITE:  NA    O2 SAFETY:    Concentrator positioning (6\" from furniture/drapes), Tanks stored in weiner , No petroleum based products on face while oxygen in use and Oxygen sign on the door    FALL INTERVENTIONS PROVIDED:   Implemented/recommended use of non-skid footwear, Implemented/recommended use of fall risk identification flag to all team members, Implemented/recommended assistive devices and encouraged their use, Implemented/recommended resources for alarm system (personal alarm, bed alarm, call bell, etc.) , Implemented/recommended environmental changes (remove hazards, lower bed, improve lighting, etc.) and Implemented/recommended increased supervision/assistance    INTERDISPLINARY COMMUNICATION/COLLABORATION:  Physician, MSW, [place] and RN, CNA    NEW MEDICATION INITIATION DOCUMENTATION:  Consulted AT MD to report change in pt status, Obtained Order from Provider for initiation of symptom relief medication /other medication needed and Documentation completed in Clinical Note in [facility]    Reason medication is being initiated:  Management of patient's anxiety and complaints of pain and constipation    MD / Provider name consulted re: change in status / initiation of new medication:  Dr. Jonnie Santamaria Symptom(s): Anxiety, pain, constipation    New Order(s):  Scheduled Dilaudid,     Name of the person notified of the changes:  [facility]    Name of [facility] being taught[facility]  [facility]    Instructions given:  Yes    Side Effects taught:  Yes    Response to teaching:  Verbalized understanding and agreement    COMFORTABLE DYING MEASURE:  Is Patient/family satisfied with symptom level?   Yes    DISCHARGE PLAN:  Return home with hospice

## 2022-07-18 NOTE — PROGRESS NOTES
Problem: Risk for Falls  Goal: Free of falls during inpatient stay  Description: Patient will be free of falls during inpatient stay.   Outcome: Progressing Towards Goal     Problem: Pressure Injury - Risk of  Goal: *Prevention of pressure injury  Outcome: Progressing Towards Goal  Note: Pressure Injury Interventions:  Sensory Interventions: Assess changes in LOC    Moisture Interventions: Absorbent underpads    Activity Interventions: Pressure redistribution bed/mattress(bed type)    Mobility Interventions: Pressure redistribution bed/mattress (bed type)    Nutrition Interventions: Document food/fluid/supplement intake,Offer support with meals,snacks and hydration    Friction and Shear Interventions: HOB 30 degrees or less,Lift sheet

## 2022-07-18 NOTE — PROGRESS NOTES
[facility]  [phone]    Patient Name: Melissa Cervantes  YOB: 1940    Date of Provider Hospice Visit: 07/18/22    Level of Care:   [] General Inpatient (GIP)    [x] Routine   [] Respite    Current Location of Care:  [] [facility] [] [facility] [] [facility] [] [facility] [x] Hospice House [facility]      Principle Hospice Diagnosis: Leiomyosarcoma       HOSPICE SUMMARY     Chart Reviewed for patient's medical history and hospice care plan. Hospice Physician Certification/Recertification Narrative per Michael Bronson / kaleb CARTER:     Patient is a 36-year-old male admitted to hospice care on 7/2/2022 secondary to metastatic leiomyosarcoma. Patient with metastatic disease to the back, malignant pleural effusion, lungs. Patient not a candidate for further cancer directed therapy. Patient is a retired nephrologist.  He had been seen by our outpatient palliative team.  He has had a significant decline in functional status related to the advancement of his cancer. Patient sent to the [facility] under respite level care secondary caregiver exhaustion. Met with Dr. Pavithra Robles for an extended time. Reviewed his history and what led up to his diagnosis. He definitely is having increasing pain to the right scapular area-site of tumor. Disease with extensive progression on recent imaging. He also has noticed increased confusion and his wife is exhausted. He has been sleeping a significant amount and states minimal appetite. He does feel like the oxycodone 15 mg has been effective so far but anticipates he may need to have this adjusted soon. 7/14-patient doing okay overall. Apparently at home, he actually had been using oxycodone 30 mg. Continues to require medicine essentially 4 times a day on a scheduled basis. We discussed scheduling the medication. 7/18-patient was checked on over the weekend and was sleeping most of the time.   Reviewed the note yesterday from social work-appreciate Tia's help. Meeting had with patient, -Yusuf, hospice house -Sajan, and Elisa-bedside nurse. When I met with patient at the end of last week, we agreed to start oxycodone 30 mg every 6 hours scheduled and every hour as needed for breakthrough pain. It appeared this had been effective as he had only required 1 as needed dose in the first 24 hours. Patient also is having issues with constipation and we discussed multiple options and we agreed to start with senna daily and monitor. Patient states this morning that the oxycodone is not effective. He has declined most doses in the last 24 hours. He also feels like he is having abdominal discomfort and not sure if this is related to ongoing constipation. He is eating much less and does feel like he is more dyspneic. He clearly is struggling with loss of control. He does not want to return home as he feels he is a burden on his wife and he does not feel like she could care for him in this situation. Patient is honest that he is ready to die. We agreed that he would allow us to attempt different medication since he is now telling us the current medication is not effective    General-thin, alert, tired appearing, alert to person, place  Lungs-definitely diminished on the right at the site of his large tumor, left appears relatively clear  Cardiovascular-appears regular rate and rhythm, 2/6 to 3/6 systolic murmur heard best at the right upper sternal border  Extremities-no significant edema  Neuro nonfocal  Abdomen-appears soft, slight tenderness to the left mid to lower quadrant but no rebound no palpable mass, no palpable stool  Psych-anxious, verbose but also frustrated with situation    Hospice diagnosis  1. Cancer associated pain  2. Abdominal pain-constipation versus progression of disease  3. Anxiety  4. Hospice care     PLAN   1. Patient will transition to routine level of care.   We had a long discussion about routine level care versus respite care and Medicare guidelines. We just provided education that under routine level of care, there is a cost for any facility to include the hospice house when you are under hospice care. The cost for the hospice house is $256 per day. He agrees to pay this cost and I did talk with his wife later who also agrees. We also explained the hospice house is not a long-term care facility and so if he cannot return home but symptoms are managed, we will have to look at other options. 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 2. Pain regimen- we will change to Dilaudid 8 mg every 4 hours scheduled and every hour as needed for breakthrough pain which would approximately be equivalent dosing of the oxycodone 30 mg. We will attempt a liquid medication first and see if this effective. 3. Constipation-discussed options with the patient. We discussed enema versus attempting oral medication. He seems to think were not being aggressive enough in treating this so we will add lactulose 60 mL twice daily and also can consider enema. 4. Anxiety-Valium 10 mg every 8 hours scheduled and every 4 hours as needed for breakthrough  5. I talked with his wife on the phone and updated her on the situation. She is okay with patient staying. She does feel like patient more anxious as he is losing control of the situation. Totally agreed with her and explained I know this is difficult. I will try to continue to update her on the plan of care. Challenging situation given the advanced cancer and the overall decline. 6. Plan reviewed with Alicia Mcdonough, home , bedside nurse-Elisa, and Pat Paz who all were at the bedside      7.  and SW to support family needs. 8. Disposition:  To be determined

## 2022-07-18 NOTE — PROGRESS NOTES
Problem: Potential for Alteration in Skin Integrity  Goal: Monitor skin for areas of alteration in skin integrity  Description: Patient/family/caregiver will demonstrate ability to care for patient's skin, monitor for areas of breakdown, and demonstrate methods to prevent breakdown during hospice care. Outcome: Progressing Towards Goal     Problem: Risk for Falls  Goal: Free of falls during inpatient stay  Description: Patient will be free of falls during inpatient stay. Outcome: Progressing Towards Goal     Problem: Alteration in Mobility  Goal: Remain as independent as possible and remain safe in environment  Description: Patient will remain as independent as possible and remain safe in their environment. Outcome: Progressing Towards Goal     Problem: Pain  Goal: Assess satisfaction of level of comfort and symptom control  Outcome: Progressing Towards Goal     Problem: Anxiety/Agitation  Goal: Verbalize or staff assess the ability to manage anxiety  Description: The patient/family/caregiver will verbalize and demonstrate ability to manage the patient's anxiety throughout hospice care.   Outcome: Progressing Towards Goal     Problem: Breathing Pattern - Ineffective  Goal: *Use of effective breathing techniques  Outcome: Progressing Towards Goal     Problem: Pressure Injury - Risk of  Goal: *Prevention of pressure injury  Outcome: Progressing Towards Goal  Note: Pressure Injury Interventions:  Sensory Interventions: Assess changes in LOC    Moisture Interventions: Absorbent underpads    Activity Interventions: Pressure redistribution bed/mattress(bed type)    Mobility Interventions: Pressure redistribution bed/mattress (bed type)    Nutrition Interventions: Document food/fluid/supplement intake    Friction and Shear Interventions: HOB 30 degrees or less                Problem: Falls - Risk of  Goal: *Absence of Falls  Description: Document Jojo Fall Risk and appropriate interventions in the flowsheet.   Outcome: Progressing Towards Goal  Note: Fall Risk Interventions:  Mobility Interventions: Bed/chair exit alarm         Medication Interventions: Bed/chair exit alarm,Patient to call before getting OOB    Elimination Interventions: Bed/chair exit alarm,Call light in reach              Problem: Patient Education: Go to Patient Education Activity  Goal: Patient/Family Education  Outcome: Progressing Towards Goal

## 2022-07-18 NOTE — HOSPICE
MSW made visit with patient for emotional support and to discuss discharge plans for tomorrow as [facility] RN shared that patient had shared that he did not want to return home tomorrow. MSW reviewed patient's chart to review conversation that patient had with staff prior to respite. Chart review showed that patient's home MSW explained that respite is for five nights and also that home MSW had conversation with patient about hiring additional help in the home. During visit, patient alert and oriented x 4 and observed lying in bed with eyes closed for most of visit. Patient shared in some life review, talking about his wife and how they met. Patient shared that he did not think that he was in a condition that his wife could manage at home; shared that he would not be going home tomorrow and also noted that he would not pay to stay longer as he needed to be at [facility]. MSW reviewed with patient that other staff has talked to him about time limit of respite and that Medicare will not pay for additional days, but that MSW can share with [facility] director and Medical Director patient's wish to extend stay a few days. Patient shared that he felt that his pain is not well managed and that he has not had a chance to talk to the doctor to review making any changes. MSW observed that patient appeared to be uncomfortable and patient stated that he was having pain in his stomach and back; shared this had started in the last couple of days. MSW offered to get nurse so that patient could get pain medication, patient declined, stated that this was not needed. Patient shared that he did not feel that he should take medication if he didn't know why he was having pain, feel that staff need to find out why he is having pain before medicating to make sure he was taking the proper medications. MSW talked with patient about hospice treating symptoms versus doing diagnostic tests.  Patient expressed understanding, but shared that the did not think this was the correct way to treat his pain. MSW also talked with patient more about plans for his care and shared that MSW can talk to him about placement in a facility or hiring more help in the home. Patient shared that he has help at home, but that a lot of the work falls to his wife and he did not feel that she could manager his care due to her age. MSW shared that patient cannot stay indefinitely at [facility] as it is not a long term facility. Patient shared that he felt that he was told he could come to [facility] and now hospice is trying to charge him. MSW reviewed that his respite stay was fully covered and that Medicare will not cover continued stay. MSW also shared that at the home, hospice staff is available for support to him and his wife 24/7 as needed to address issues. MSW offered to assist patient and wife with having a safe plan for discharge. Patient shared that he was not sure if his wife had not shared that staff was available for support, but still does not feel that he can go home and again shared that he will not pay to remain at [facility]. Patient shared that he was uncomfortable in bed and would like nurse to help him get adjusted in bed. MSW left to get nurse and shared that MSW and [facility] director and Medical Director would revisit conversation with patient in the morning. Patient shared that he would not change his mind. MSW left and updated nurse on conversation and patient's need for assistance.      CORTES Scales  Time in: 6:30 PM  Time out: 7:15 PM

## 2022-07-19 NOTE — PROGRESS NOTES
Problem: Potential for Alteration in Skin Integrity  Goal: Monitor skin for areas of alteration in skin integrity  Description: Patient/family/caregiver will demonstrate ability to care for patient's skin, monitor for areas of breakdown, and demonstrate methods to prevent breakdown during hospice care. Outcome: Progressing Towards Goal     Problem: Risk for Falls  Goal: Free of falls during inpatient stay  Description: Patient will be free of falls during inpatient stay. Outcome: Progressing Towards Goal     Problem: Alteration in Mobility  Goal: Remain as independent as possible and remain safe in environment  Description: Patient will remain as independent as possible and remain safe in their environment. Outcome: Progressing Towards Goal     Problem: Pain  Goal: Assess satisfaction of level of comfort and symptom control  Outcome: Progressing Towards Goal  Goal: *Control of acute pain  Outcome: Progressing Towards Goal     Problem: Anxiety/Agitation  Goal: Verbalize or staff assess the ability to manage anxiety  Description: The patient/family/caregiver will verbalize and demonstrate ability to manage the patient's anxiety throughout hospice care.   Outcome: Progressing Towards Goal     Problem: Breathing Pattern - Ineffective  Goal: *Use of effective breathing techniques  Outcome: Progressing Towards Goal     Problem: Pressure Injury - Risk of  Goal: *Prevention of pressure injury  Outcome: Progressing Towards Goal  Note: Pressure Injury Interventions:  Sensory Interventions: Assess changes in LOC    Moisture Interventions: Absorbent underpads    Activity Interventions: Pressure redistribution bed/mattress(bed type)    Mobility Interventions: Pressure redistribution bed/mattress (bed type)    Nutrition Interventions: Document food/fluid/supplement intake    Friction and Shear Interventions: HOB 30 degrees or less                Problem: Falls - Risk of  Goal: *Absence of Falls  Description: Document Jacek Barnhart Fall Risk and appropriate interventions in the flowsheet. Outcome: Progressing Towards Goal  Note: Fall Risk Interventions:  Mobility Interventions: Bed/chair exit alarm    Mentation Interventions: Adequate sleep, hydration, pain control    Medication Interventions: Bed/chair exit alarm    Elimination Interventions: Bed/chair exit alarm              Problem: Patient Education: Go to Patient Education Activity  Goal: Patient/Family Education  Outcome: Progressing Towards Goal     Problem: Pressure Injury - Risk of  Goal: *Prevention of pressure injury  Description: Document Vega Scale and appropriate interventions in the flowsheet.   Outcome: Progressing Towards Goal  Note: Pressure Injury Interventions:  Sensory Interventions: Assess changes in LOC    Moisture Interventions: Absorbent underpads    Activity Interventions: Pressure redistribution bed/mattress(bed type)    Mobility Interventions: Pressure redistribution bed/mattress (bed type)    Nutrition Interventions: Document food/fluid/supplement intake    Friction and Shear Interventions: HOB 30 degrees or less                Problem: Patient Education: Go to Patient Education Activity  Goal: Patient/Family Education  Outcome: Progressing Towards Goal

## 2022-07-19 NOTE — HOSPICE
0700 Received report from [facility].  4216 Patient is sleeping with relaxed face. 0820 Completed assessment, attempted to give scheduled Dilaudid 8 mg patient shook his head no he did not  Want medications right now. Patient is very sleepy. 0945 Patient sleeping with relaxed face. 1130 Patient is sleeping with relaxed face. 1230 Patient is snoring with relaxed face. NAME OF PATIENT:  Carol Ventura    LEVEL OF CARE:  Routine    REASON FOR GIP:   NA    *PATIENT REMAINS ELIGIBLE FOR GIP LEVEL OF CARE AS EVIDENCED BY: (MUST BE ADDRESSED OF PATIENT GIP)  NA    REASON FOR RESPITE:  NA    O2 SAFETY:  Tanks stored in weiner , No petroleum based products on face while oxygen in use and Oxygen sign on the door    FALL INTERVENTIONS PROVIDED:   Implemented/recommended use of fall risk identification flag to all team members, Implemented/recommended assistive devices and encouraged their use, Implemented/recommended resources for alarm system (personal alarm, bed alarm, call bell, etc.) , Implemented/recommended environmental changes (remove hazards, lower bed, improve lighting, etc.) and Implemented/recommended increased supervision/assistance    INTERDISPLINARY COMMUNICATION/COLLABORATION:  Physician and RN, CNA    NEW MEDICATION INITIATION DOCUMENTATION:  NA    Reason medication is being initiated:  NA    MD / Provider name consulted re: change in status / initiation of new medication:  NA    New Symptom(s):  NA    New Order(s):  NA    Name of the person notified of the changes:  NA    Name of person being taught:  NA    Instructions given:  NA    Side Effects taught:  NA    Response to teaching:  NA      COMFORTABLE DYING MEASURE:  Is Patient/family satisfied with symptom level?  yes    DISCHARGE PLAN:  Home with hospice.

## 2022-07-19 NOTE — HOSPICE
met with pt and pt's wife, Isabel Moreau, at [facility] beside. Pt and Isabel Moreau had the lights off in the pt's room and were holding hands. Pt was awake and laying in the bed. SW introduced self and offered emotional support. Pt nodded at SW and waved, but did not use spoken language to engage with SW. Pt's wife reported that the family did not have any needs at this time. Pt waved goodbye to SW. Pt continues to receive routine care at [facility] for pain management. SW will continue to provide support with navigating resources, will monitor the family/caregiver's needs, will proivde supportive counseling, and will provide support to the family in understanding hospice and ancillary services.      CHRISTO Staton, BSW  Supervised by: Calista Llanes LCSW

## 2022-07-19 NOTE — PROGRESS NOTES
Problem: Potential for Alteration in Skin Integrity  Goal: Monitor skin for areas of alteration in skin integrity  Description: Patient/family/caregiver will demonstrate ability to care for patient's skin, monitor for areas of breakdown, and demonstrate methods to prevent breakdown during hospice care. Outcome: Progressing Towards Goal     Problem: Risk for Falls  Goal: Free of falls during inpatient stay  Description: Patient will be free of falls during inpatient stay. Outcome: Progressing Towards Goal     Problem: Alteration in Mobility  Goal: Remain as independent as possible and remain safe in environment  Description: Patient will remain as independent as possible and remain safe in their environment. Outcome: Progressing Towards Goal     Problem: Pain  Goal: Assess satisfaction of level of comfort and symptom control  Outcome: Progressing Towards Goal  Goal: *Control of acute pain  Outcome: Progressing Towards Goal     Problem: Anxiety/Agitation  Goal: Verbalize or staff assess the ability to manage anxiety  Description: The patient/family/caregiver will verbalize and demonstrate ability to manage the patient's anxiety throughout hospice care.   Outcome: Progressing Towards Goal     Problem: Breathing Pattern - Ineffective  Goal: *Use of effective breathing techniques  Outcome: Progressing Towards Goal     Problem: Pressure Injury - Risk of  Goal: *Prevention of pressure injury  Outcome: Progressing Towards Goal  Note: Pressure Injury Interventions:  Sensory Interventions: Assess need for specialty bed    Moisture Interventions: Absorbent underpads    Activity Interventions: Pressure redistribution bed/mattress(bed type)    Mobility Interventions: Pressure redistribution bed/mattress (bed type)    Nutrition Interventions: Document food/fluid/supplement intake    Friction and Shear Interventions: HOB 30 degrees or less                Problem: Falls - Risk of  Goal: *Absence of Falls  Description: Document Jojo Fall Risk and appropriate interventions in the flowsheet. Outcome: Progressing Towards Goal  Note: Fall Risk Interventions:  Mobility Interventions: Bed/chair exit alarm    Mentation Interventions: Adequate sleep, hydration, pain control,Bed/chair exit alarm    Medication Interventions: Bed/chair exit alarm    Elimination Interventions: Bed/chair exit alarm,Call light in reach              Problem: Patient Education: Go to Patient Education Activity  Goal: Patient/Family Education  Outcome: Progressing Towards Goal     Problem: Pressure Injury - Risk of  Goal: *Prevention of pressure injury  Description: Document Vega Scale and appropriate interventions in the flowsheet.   Outcome: Progressing Towards Goal  Note: Pressure Injury Interventions:  Sensory Interventions: Assess need for specialty bed    Moisture Interventions: Absorbent underpads    Activity Interventions: Pressure redistribution bed/mattress(bed type)    Mobility Interventions: Pressure redistribution bed/mattress (bed type)    Nutrition Interventions: Document food/fluid/supplement intake    Friction and Shear Interventions: HOB 30 degrees or less                Problem: Patient Education: Go to Patient Education Activity  Goal: Patient/Family Education  Outcome: Progressing Towards Goal

## 2022-07-19 NOTE — HOSPICE
LMSW arrived at the [facility] to complete a routine visit for [facility]. This LMSW, Medical Director, [facility] Manager, and the floor RN were present with this patient for a meeting as the patient was to be discharged home today. The patient is an 80 y/o  male with a BSHPC Dx. Malignant neoplasm metastatic to soft tissue of back. Patient was received lying on his bed using O2 NC. Patient was alert and oriented x 4. Patient reported that his appetite is poor both solids and liquids. Patient's mood was appropriate and congruent with conversation. The patient complained of new pain and symptoms and the Medical Director allowed this patient to stay routine for 2-3 days to work on new pain and symptoms and the patient agreed to pay the routine days to stay. The patient's wife has been updated on the POC. LMSW provided emotional support, and information on the ancillary services.

## 2022-07-20 NOTE — PROGRESS NOTES
[address]        Pharmacist monitoring provided for this patient at [address] List      Admitting dianosis treated appropriately : YES     Nausea/Vomiting controlled: YES     Pain Controlled: YES     Agitation/Anxiety/ Delirium controlled: YES     Depression controlled: YES     Secretions controlled : YES     Constipation/Bowel routine controlled: YES     SOB/ Dyspnea controlled: YES     Insomnia: YES     Thank you,        Billie Blood, PharmD, BCPS

## 2022-07-20 NOTE — PROGRESS NOTES
Problem: Potential for Alteration in Skin Integrity  Goal: Monitor skin for areas of alteration in skin integrity  Description: Patient/family/caregiver will demonstrate ability to care for patient's skin, monitor for areas of breakdown, and demonstrate methods to prevent breakdown during hospice care. Outcome: Progressing Towards Goal     Problem: Risk for Falls  Goal: Free of falls during inpatient stay  Description: Patient will be free of falls during inpatient stay. Outcome: Progressing Towards Goal     Problem: Alteration in Mobility  Goal: Remain as independent as possible and remain safe in environment  Description: Patient will remain as independent as possible and remain safe in their environment. Outcome: Progressing Towards Goal     Problem: Pain  Goal: Assess satisfaction of level of comfort and symptom control  Outcome: Progressing Towards Goal     Problem: Anxiety/Agitation  Goal: Verbalize or staff assess the ability to manage anxiety  Description: The patient/family/caregiver will verbalize and demonstrate ability to manage the patient's anxiety throughout hospice care. Outcome: Progressing Towards Goal     Problem: Breathing Pattern - Ineffective  Goal: *Use of effective breathing techniques  Outcome: Progressing Towards Goal     Problem: Pressure Injury - Risk of  Goal: *Prevention of pressure injury  Description: Document Vega Scale and appropriate interventions in the flowsheet.   Outcome: Progressing Towards Goal  Note: Pressure Injury Interventions:  Sensory Interventions: Assess changes in LOC, Check visual cues for pain, Float heels, Keep linens dry and wrinkle-free, Minimize linen layers    Moisture Interventions: Absorbent underpads, Apply protective barrier, creams and emollients, Maintain skin hydration (lotion/cream)    Activity Interventions: Pressure redistribution bed/mattress(bed type)    Mobility Interventions: Float heels, HOB 30 degrees or less, Pressure redistribution bed/mattress (bed type)    Nutrition Interventions: Document food/fluid/supplement intake, Offer support with meals,snacks and hydration    Friction and Shear Interventions: Apply protective barrier, creams and emollients, HOB 30 degrees or less, Lift sheet, Minimize layers

## 2022-07-20 NOTE — PROGRESS NOTES
[facility] Help to Those in Need  [phone]    Patient Name: Gisele Durham  YOB: 1940    Date of Provider Hospice Visit: 07/20/22    Level of Care:   [x] General Inpatient (GIP)    [] Routine   [] Respite    Current Location of Care:  [] [facility] [place] [] [facility] [] [address] [address] [] [facility] [x] Hospice House [facility]      Principle Hospice Diagnosis: Leiomyosarcoma       HOSPICE SUMMARY     Chart Reviewed for patient's medical history and hospice care plan. Hospice Physician Certification/Recertification Narrative per Brittani Elizalde / other MD:     Patient is a 81-year-old male admitted to hospice care on 7/2/2022 secondary to metastatic leiomyosarcoma. Patient with metastatic disease to the back, malignant pleural effusion, lungs. Patient not a candidate for further cancer directed therapy. Patient is a retired nephrologist.  He had been seen by our outpatient palliative team.  He has had a significant decline in functional status related to the advancement of his cancer. Patient sent to the [facility] hospice house under respite level care secondary caregiver exhaustion. Met with Dr. Sage Angulo for an extended time. Reviewed his history and what led up to his diagnosis. He definitely is having increasing pain to the right scapular area-site of tumor. Disease with extensive progression on recent imaging. He also has noticed increased confusion and his wife is exhausted. He has been sleeping a significant amount and states minimal appetite. He does feel like the oxycodone 15 mg has been effective so far but anticipates he may need to have this adjusted soon. 7/14-patient doing okay overall. Apparently at home, he actually had been using oxycodone 30 mg. Continues to require medicine essentially 4 times a day on a scheduled basis. We discussed scheduling the medication. 7/18-patient was checked on over the weekend and was sleeping most of the time.   Reviewed the note yesterday from social work-appreciate Tia's help. Meeting had with patient, -Yusuf, hospice house -Sajan, and Elisa-bedside nurse. When I met with patient at the end of last week, we agreed to start oxycodone 30 mg every 6 hours scheduled and every hour as needed for breakthrough pain. It appeared this had been effective as he had only required 1 as needed dose in the first 24 hours. Patient also is having issues with constipation and we discussed multiple options and we agreed to start with senna daily and monitor. Patient states this morning that the oxycodone is not effective. He has declined most doses in the last 24 hours. He also feels like he is having abdominal discomfort and not sure if this is related to ongoing constipation. He is eating much less and does feel like he is more dyspneic. He clearly is struggling with loss of control. He does not want to return home as he feels he is a burden on his wife and he does not feel like she could care for him in this situation. Patient is honest that he is ready to die. We agreed that he would allow us to attempt different medication since he is now telling us the current medication is not effective    7/20-patient with rapid decline overnight and requiring IV meds now and changed to GIP LOC. Unresponsive this morning    General-thin, unresponsive, labored breathing, restless, secretions  Lungs-increased WOB, audible secretions  Cardiovascular-appears regular rate and rhythm, 2/6 to 3/6 systolic murmur heard best at the right upper sternal border  Extremities-no significant edema, cool extremities  Neuro nonfocal  Abdomen-soft  Psych-restless    Hospice diagnosis  1. Cancer associated pain  2. Abdominal pain-constipation versus progression of disease  3. Anxiety  4. Hospice care  5. Secretions  6.  Restless     PLAN   Patient will transition to GIP LOC and needs frequent nursing assessments, IV meds  Pain regimen- dilaudid 2 mg IV every 4 scheduled and every 15 min prn. Restlessness-phenobaribitol 65 mg IV every 6 and versed every 15 min prn  Plan reviewed with bedside nurse  Plan reviewed with children-son and daughter at the bedside       and SW to support family needs.   Disposition: death at [facility]

## 2022-07-20 NOTE — HOSPICE
1900 [facility] incontinent of stool. Samaria care provided with CNA and RN Quan Ybarra. 1955 RN assessment completed. Patient oriented to self. Asked patient if he could open his eyes, patient stated yes but then did not open eyes. Stated his wifes name is Carl Mancuso, actual name is Cruzito Camacho. Patiens with coarse lung sounds. Administered scheduled SL dilaudid. Patient needing cues to swallow. 2120 Patient resting with eyes closed, head of bed elevated. Respirations unlabored. 2215 Administered scheduled valium. Patient needing cues to open mouth. Would not drink through the straw. Water dropped in his mouth through straw. Held scheduled flomax due to difficulty swallowing. Lung sound very coarse. Administered prn levsin under patients tongue. Repositioned onto left side. 0010 Patient responds to his name being called. Administered scheduled SL dilaudid. Patient still needing cues to swallow. Upper lung sounds remains coarse. RR 26.  0100 Patient pulling at his shirt and picking at the air. Administered prn haldol. Incontinent of urine. Samaria care provided. Repositioned onto right side. 0200 Patient resting with eyes closed, secretions less audible, RR 22.  0345 Patient resting on his right side, eyes closed, secretions becoming more audible. 2572 Administered scheduled SL dilaudid and prn levsin for secretions. Patient lethargic but arousable. Having difficulty swallowing meds. Holding meds in his mouth, needing multiple cues to swallow. RR 24.  0530 Patient resting with eyes closed, respirations becoming increasingly coarse. 2772 Patient incontinent of urine. Samaria care provided and brief changed. Repositioned onto left side. Sweatshirt removed. Respirations labored, RR 28.  0625 Received orders from [facility] for change to GIP LOC and IV dilaudid and robinul. 0630 Patients skin is warm and diaphoretic. Axillary temp 99.4. Administered prn IV dilaudid and scheduled IV robinul. 8680 Phone call placed to patients wife, informed her of change in patient condition and level of care. 4784 Received orders from Dr Leo Brito for IV phenobarbital and versed. 0700 Report given to Kenji Harris, [address] [facility] Administered scheduled IV phenobarbital and prn versed. Respirations coarse, RR 20. Suction set up at the bedside. Small amount of secretions suctioned from patients mouth. Patient did not respond to name being called. NAME OF PATIENT:  Fabiana Prior    LEVEL OF CARE:  Routine    REASON FOR GIP:   n/a    *PATIENT REMAINS ELIGIBLE FOR GIP LEVEL OF CARE AS EVIDENCED BY: (MUST BE ADDRESSED OF PATIENT GIP)      REASON FOR RESPITE:  n/a    O2 SAFETY:  Concentrator positioning (6\" from furniture/drapes), No petroleum based products on face while oxygen in use and Oxygen sign on the door    FALL INTERVENTIONS PROVIDED:   Implemented/recommended use of non-skid footwear, Implemented/recommended use of fall risk identification flag to all team members, Implemented/recommended assistive devices and encouraged their use, Implemented/recommended resources for alarm system (personal alarm, bed alarm, call bell, etc.) , Implemented/recommended environmental changes (remove hazards, lower bed, improve lighting, etc.) and Implemented/recommended increased supervision/assistance    INTERDISPLINARY COMMUNICATION/COLLABORATION:  Physician, MSW, [place] and RN, CNA    NEW MEDICATION INITIATION DOCUMENTATION:  n/a    Reason medication is being initiated:  n/a    MD / Provider name consulted re: change in status / initiation of new medication:  n/a    New Symptom(s):  n/a    New Order(s):  n/a    Name of the person notified of the changes:  n/a    Name of person being taught:  n/a    Instructions given:  n/a    Side Effects taught:  n/a    Response to teaching:  n/a      COMFORTABLE DYING MEASURE:  Is Patient/family satisfied with symptom level?  yes    DISCHARGE PLAN:  Return home with hospice.

## 2022-07-20 NOTE — HOSPICE
0700 Receive report from Kait Almaguer. 0730 is unresponsive with shallow respirations. Please see flow sheet for assessment. 7983 Gave prn dose of Dilaudid 2 mg, and 2 mg of Versed. Patient is sleeping with relaxed face and shallow respirations. 6471 Patient is unresponsive with shallow respirations with family at bedside. 1005 Patient is having long periods of apnea up to 45 seconds. 1007 Patient stopped breathing RN auscultating absent heart sounds for 1 minute. 1008 Patient .     NAME OF PATIENT:  Fabiana Prior    LEVEL OF CARE: GIP    REASON FOR GIP:   Pain, despite numerous changes in medications and Stabilizing treatment that cannot take place at home    *PATIENT REMAINS ELIGIBLE FOR GIP LEVEL OF CARE AS EVIDENCED BY: (MUST BE ADDRESSED OF PATIENT GIP)      REASON FOR RESPITE:  NA    O2 SAFETY:  Tanks stored in weiner , No petroleum based products on face while oxygen in use, and Oxygen sign on the door    FALL INTERVENTIONS PROVIDED:   Implemented/recommended use of fall risk identification flag to all team members, Implemented/recommended assistive devices and encouraged their use, Implemented/recommended resources for alarm system (personal alarm, bed alarm, call bell, etc.) , Implemented/recommended environmental changes (remove hazards, lower bed, improve lighting, etc.), and Implemented/recommended increased supervision/assistance    INTERDISPLINARY COMMUNICATION/COLLABORATION:  Physician, MSW, and RN, CNA    NEW MEDICATION INITIATION DOCUMENTATION:  NA    Reason medication is being initiated:  АННА    MD / Provider name consulted re: change in status / initiation of new medication:  NA    New Symptom(s): NA    New Order(s): NA    Name of the person notified of the changes: NA    Name of person being taught:  NA    Instructions given:  NA    Side Effects taught: NA    Response to teaching:  NA      COMFORTABLE DYING MEASURE:  Is Patient/family satisfied with symptom level?  yes    DISCHARGE PLAN: End  of life.

## 2022-07-22 ENCOUNTER — HOME CARE VISIT (OUTPATIENT)
Dept: HOSPICE | Facility: HOSPICE | Age: 82
End: 2022-07-22
Payer: MEDICARE

## 2022-08-17 ENCOUNTER — HOME CARE VISIT (OUTPATIENT)
Dept: HOSPICE | Facility: HOSPICE | Age: 82
End: 2022-08-17
Payer: MEDICARE

## 2023-09-23 NOTE — PROGRESS NOTES
Problem: Potential for Alteration in Skin Integrity  Goal: Monitor skin for areas of alteration in skin integrity  Description: Patient/family/caregiver will demonstrate ability to care for patient's skin, monitor for areas of breakdown, and demonstrate methods to prevent breakdown during hospice care. Outcome: Resolved/Met     Problem: Risk for Falls  Goal: Free of falls during inpatient stay  Description: Patient will be free of falls during inpatient stay. Outcome: Resolved/Met     Problem: Alteration in Mobility  Goal: Remain as independent as possible and remain safe in environment  Description: Patient will remain as independent as possible and remain safe in their environment. Outcome: Resolved/Met     Problem: Pain  Goal: Assess satisfaction of level of comfort and symptom control  Outcome: Resolved/Met  Goal: *Control of acute pain  Outcome: Resolved/Met     Problem: Anxiety/Agitation  Goal: Verbalize or staff assess the ability to manage anxiety  Description: The patient/family/caregiver will verbalize and demonstrate ability to manage the patient's anxiety throughout hospice care. Outcome: Resolved/Met     Problem: Breathing Pattern - Ineffective  Goal: *Use of effective breathing techniques  Outcome: Resolved/Met     Problem: Pressure Injury - Risk of  Goal: *Prevention of pressure injury  Outcome: Resolved/Met     Problem: Falls - Risk of  Goal: *Absence of Falls  Description: Document Jojo Fall Risk and appropriate interventions in the flowsheet. Outcome: Resolved/Met     Problem: Patient Education: Go to Patient Education Activity  Goal: Patient/Family Education  Outcome: Resolved/Met     Problem: Pressure Injury - Risk of  Goal: *Prevention of pressure injury  Description: Document Vega Scale and appropriate interventions in the flowsheet.   Outcome: Resolved/Met     Problem: Patient Education: Go to Patient Education Activity  Goal: Patient/Family Education  Outcome: Resolved/Met No indicators present